# Patient Record
Sex: FEMALE | Race: WHITE | NOT HISPANIC OR LATINO | Employment: OTHER | URBAN - METROPOLITAN AREA
[De-identification: names, ages, dates, MRNs, and addresses within clinical notes are randomized per-mention and may not be internally consistent; named-entity substitution may affect disease eponyms.]

---

## 2017-01-03 ENCOUNTER — INFUSION (OUTPATIENT)
Dept: INFUSION THERAPY | Facility: HOSPITAL | Age: 72
End: 2017-01-03
Attending: INTERNAL MEDICINE
Payer: MEDICARE

## 2017-01-03 DIAGNOSIS — C50.912 MALIGNANT NEOPLASM OF LEFT FEMALE BREAST, UNSPECIFIED SITE OF BREAST: Primary | ICD-10-CM

## 2017-01-03 PROCEDURE — 63600175 PHARM REV CODE 636 W HCPCS: Mod: PN

## 2017-01-03 PROCEDURE — 96401 CHEMO ANTI-NEOPL SQ/IM: CPT | Mod: PN

## 2017-01-03 RX ADMIN — DENOSUMAB 120 MG: 120 INJECTION SUBCUTANEOUS at 10:01

## 2017-02-02 ENCOUNTER — INFUSION (OUTPATIENT)
Dept: INFUSION THERAPY | Facility: HOSPITAL | Age: 72
End: 2017-02-02
Attending: INTERNAL MEDICINE
Payer: MEDICARE

## 2017-02-02 VITALS — RESPIRATION RATE: 20 BRPM | SYSTOLIC BLOOD PRESSURE: 140 MMHG | DIASTOLIC BLOOD PRESSURE: 66 MMHG | HEART RATE: 97 BPM

## 2017-02-02 DIAGNOSIS — C50.912 MALIGNANT NEOPLASM OF LEFT FEMALE BREAST, UNSPECIFIED SITE OF BREAST: Primary | ICD-10-CM

## 2017-02-02 PROCEDURE — 63600175 PHARM REV CODE 636 W HCPCS: Mod: PN

## 2017-02-02 PROCEDURE — 96401 CHEMO ANTI-NEOPL SQ/IM: CPT | Mod: PN

## 2017-02-02 RX ADMIN — DENOSUMAB 120 MG: 120 INJECTION SUBCUTANEOUS at 11:02

## 2017-03-02 ENCOUNTER — INFUSION (OUTPATIENT)
Dept: INFUSION THERAPY | Facility: HOSPITAL | Age: 72
End: 2017-03-02
Attending: INTERNAL MEDICINE
Payer: MEDICARE

## 2017-03-02 DIAGNOSIS — C50.912 MALIGNANT NEOPLASM OF LEFT FEMALE BREAST, UNSPECIFIED SITE OF BREAST: Primary | ICD-10-CM

## 2017-03-02 PROCEDURE — 63600175 PHARM REV CODE 636 W HCPCS: Mod: PN

## 2017-03-02 PROCEDURE — 96523 IRRIG DRUG DELIVERY DEVICE: CPT | Mod: PN

## 2017-03-02 PROCEDURE — 96401 CHEMO ANTI-NEOPL SQ/IM: CPT | Mod: PN

## 2017-03-02 PROCEDURE — 25000003 PHARM REV CODE 250: Mod: PN | Performed by: PHYSICIAN ASSISTANT

## 2017-03-02 RX ORDER — HEPARIN 100 UNIT/ML
500 SYRINGE INTRAVENOUS
Status: DISCONTINUED | OUTPATIENT
Start: 2017-03-02 | End: 2017-03-02 | Stop reason: HOSPADM

## 2017-03-02 RX ORDER — HEPARIN 100 UNIT/ML
500 SYRINGE INTRAVENOUS
Status: CANCELLED
Start: 2017-03-30

## 2017-03-02 RX ORDER — SODIUM CHLORIDE 0.9 % (FLUSH) 0.9 %
10 SYRINGE (ML) INJECTION
Status: CANCELLED
Start: 2017-03-30

## 2017-03-02 RX ORDER — SODIUM CHLORIDE 0.9 % (FLUSH) 0.9 %
10 SYRINGE (ML) INJECTION
Status: DISCONTINUED | OUTPATIENT
Start: 2017-03-02 | End: 2017-03-02 | Stop reason: HOSPADM

## 2017-03-02 RX ADMIN — DENOSUMAB 120 MG: 120 INJECTION SUBCUTANEOUS at 10:03

## 2017-03-02 RX ADMIN — SODIUM CHLORIDE, PRESERVATIVE FREE 10 ML: 5 INJECTION INTRAVENOUS at 10:03

## 2017-03-02 NOTE — PLAN OF CARE
Problem: Patient Care Overview  Goal: Plan of Care Review  Outcome: Ongoing (interventions implemented as appropriate)  Pt tolerated injection without difficulty.  Instructed pt to call office for questions or concerns. Pt verbalized understanding. AVS printed with pt schedule

## 2017-03-02 NOTE — MR AVS SNAPSHOT
Patient Information     Patient Name Sex Laura Balbuena Female 1945      Visit Information        Provider Department Dept Phone Center    3/2/2017 10:00 AM CHAIR 14, [facility] [phone] [facility]      Patient Instructions     None      Your Current Medications Are     ACID REDUCER, CIMETIDINE, ORAL    alprazolam (XANAX) 1 MG tablet    amlodipine (NORVASC) 5 MG tablet    aspirin (ECOTRIN) 81 MG EC tablet    CALCIUM CARBONATE (CORAL CALCIUM ORAL)    cetirizine (ZYRTEC) 10 MG tablet    clonazePAM (KLONOPIN) 1 MG tablet    coenzyme Q10 (CO Q-10) 100 mg capsule    denosumab (XGEVA) 120 mg/1.7 mL (70 mg/mL) Soln    DURAGESIC 50 mcg/hr    ERGOCALCIFEROL, VITAMIN D2, (VITAMIN D ORAL)    exemestane (AROMASIN) 25 mg tablet    hydrocodone-acetaminophen 10-325mg (NORCO)  mg Tab    levothyroxine (SYNTHROID, LEVOTHROID) 175 MCG tablet    megestrol (MEGACE ES) 625 mg/5 mL Susp    nitroGLYCERIN (NITROSTAT) 0.4 MG SL tablet    pravastatin (PRAVACHOL) 20 MG tablet    ranitidine (ZANTAC) 150 MG capsule    vortioxetine (TRINTELLIX) 10 mg Tab    DURAGESIC 50 mcg/hr (Discontinued)    hydrocodone-acetaminophen 10-325mg (NORCO)  mg Tab (Discontinued)      Facility-Administered Medications     denosumab (XGEVA) solution 120 mg    heparin, porcine (PF) 100 unit/mL injection flush 500 Units    sodium chloride 0.9% flush 10 mL      Appointments for Next Year     3/30/2017  8:30 AM ESTABLISHED PATIENT (15 min.) [facility] Prabhjot Bernardo MD    Arrive at check-in approximately 15 minutes before your scheduled appointment time. Bring all outside medical records and imaging, along with a list of your current medications and insurance card.    3/30/2017  9:30 AM INFUSION 030 MIN (30 min.) [facility] CHAIR 20, [facility] CHEMO    Arrive at check-in approximately 15 minutes before your scheduled appointment time. Bring all outside medical  "records and imaging, along with a list of your current medications and insurance card.    1st Floor    5/25/2017  3:30 PM INFUSION 030 MIN (30 min.) [facility] CHAIR 10, [facility] CHEMO    Arrive at check-in approximately 15 minutes before your scheduled appointment time. Bring all outside medical records and imaging, along with a list of your current medications and insurance card.    1st Floor    8/15/2017  1:00 PM ESTABLISHED PATIENT (30 min.) [facility] - Cardiology Isi Hartley MD    Arrive at check-in approximately 15 minutes before your scheduled appointment time. Bring all outside medical records and imaging, along with a list of your current medications and insurance card.         Default Flowsheet Data (last 24 hours)      Amb Complex Vitals Juan Diego        03/02/17 0906                Measurements    Weight 59.5 kg (131 lb 3.2 oz)        Height 5' 3" (1.6 m)        BSA (Calculated - sq m) 1.63 sq meters        BMI (Calculated) 23.3        BP (!)  159/81        Temp 97.3 °F (36.3 °C)        Pulse 102        Resp 16                Allergies     Wellbutrin [Bupropion Hcl] Anxiety      Medications You Received from 03/01/2017 1017 to 03/02/2017 1017        Date/Time Order Dose Route Action     03/02/2017 1016 denosumab (XGEVA) solution 120 mg 120 mg Subcutaneous Given     03/02/2017 1016 sodium chloride 0.9% flush 10 mL 10 mL Intravenous Given      Current Discharge Medication List     Cannot display discharge medications since this is not an admission.      "

## 2017-03-02 NOTE — NURSING
LCW powerport cleaned and accessed with one inch riddle needle. Positive blood return noted. Flushed with 10cc NS, deaccessed riddle needle and applied band aid

## 2017-03-30 ENCOUNTER — INFUSION (OUTPATIENT)
Dept: INFUSION THERAPY | Facility: HOSPITAL | Age: 72
End: 2017-03-30
Attending: INTERNAL MEDICINE
Payer: MEDICARE

## 2017-03-30 DIAGNOSIS — C50.912 MALIGNANT NEOPLASM OF LEFT FEMALE BREAST, UNSPECIFIED SITE OF BREAST: Primary | ICD-10-CM

## 2017-03-30 PROCEDURE — 96401 CHEMO ANTI-NEOPL SQ/IM: CPT | Mod: PN

## 2017-03-30 PROCEDURE — 63600175 PHARM REV CODE 636 W HCPCS: Mod: PN | Performed by: PHYSICIAN ASSISTANT

## 2017-03-30 RX ORDER — HEPARIN 100 UNIT/ML
500 SYRINGE INTRAVENOUS
Status: CANCELLED
Start: 2017-04-27

## 2017-03-30 RX ORDER — SODIUM CHLORIDE 0.9 % (FLUSH) 0.9 %
10 SYRINGE (ML) INJECTION
Status: CANCELLED
Start: 2017-04-27

## 2017-03-30 RX ADMIN — DENOSUMAB 120 MG: 120 INJECTION SUBCUTANEOUS at 09:03

## 2017-03-30 NOTE — PATIENT INSTRUCTIONS
"  Calcium (Blood)  Does this test have other names?  Total calcium, ionized calcium  What is this test?  A calcium blood test measures how much calcium is in your blood. Your health care provider can use this test to help diagnose and watch many conditions. There are two types of calcium blood tests. One is total calcium and the other is ionized calcium. Ionized calcium measures the "free" calcium in your blood. This is the calcium not bound to other parts of the blood.   Why do I need this test?  Your health care provider may order a calcium blood test to help diagnose a variety of disorders. These include kidney disease, pancreatitis, and disease of the parathyroid gland. Calcium levels may also be abnormal in many types of cancer. Your provider might also order this test as part of a routine health check.  A normal calcium level in the blood is a good sign that your body is likely working as it should. Calcium levels that are too low (hypocalcemia) or too high (hypercalcemia) can mean of a number of problems.  People with abnormal calcium levels may not have any symptoms. Very low calcium levels can cause seizures, irregular heartbeat, muscle spasms, or tingling in the hands or feet. People with high calcium levels may have nausea, vomiting, severe thirst, or constipation. Your health care provider will use the results of a blood calcium test to figure out how to treat the underlying cause of any health problems you may have.  What other tests might I have along with this test?  Calcium can be tested for a number of reasons. Other tests will vary based on what your health care provider is looking for.   Your provider may also order tests of kidney function, vitamin D, phosphorus levels, and parathyroid hormone. These tests can help figure out what is causing your abnormal calcium levels.  What do my test results mean?  Many things may affect your lab test results. These include the method each lab uses to do the " test. Some laboratories may have slightly different normal values than the ones below. Even if your test results are different from the normal value, you may not have a problem. To learn what the results mean for you, talk with your health care provider.  A normal range of total blood calcium in adults is usually between 8.5 and 10.3 milligrams/deciliter (mg/dL). Ionized calcium generally should be higher than 4.6 mg/dL to be a normal level.   How is this test done?  The test requires a blood sample, which is drawn through a needle from a vein in your arm.  Does this test pose any risks?  Taking a blood sample with a needle carries risks that include bleeding, infection, bruising, or feeling dizzy. When the needle pricks your arm, you may feel a slight stinging sensation or pain. Afterward, the site may be slightly sore.  What might affect my test results?  A number of things can affect the results of a calcium blood test. This test is typically done at the same time as other blood tests to get a better picture of your overall health. Certain medicines can change blood calcium levels and affect the test results.   How do I get ready for this test?  You don't need to prepare for this test. Be sure your health care provider knows about all medicines, herbs, vitamins, and supplements you are taking. This includes medicines that don't need a prescription and any illicit drugs you may use.    Date Last Reviewed: 6/28/2015 © 2000-2016 [employer]. [address] [place] [address]. All rights reserved. This information is not intended as a substitute for professional medical care. Always follow your healthcare professional's instructions.        Calcium Supplements  Calcium is a mineral that helps make strong bones and teeth. Most of the calcium in your body is in your bones. It takes almost half of your life (30 to 35 years) for your bones to reach their maximum size and strength (peak bone mass).  When you are younger, taking in enough calcium helps you build strong bones. When you are older, getting enough calcium in your diet helps to limit bone loss. Your body cant make calcium, so you must get it from foods or supplements.    Why use a supplement?  You might need a supplement if any of the following is true for you:  · I dont eat dairy products or other foods that are high in calcium (such as kale, bok rafi, and calcium-fortified foods) 2 to 3 times a day.  · I am a woman past menopause.  · I am pregnant or breastfeeding.  · I do not do frequent weight-bearing exercises (such as walking, running, or weightlifting).  Suggested daily amount  The daily recommended amount of calcium depends on many factors. This includes your age and if you are a man or a woman. Your health care provider can help you choose the right amount of calcium for you.   If you take calcium  Here are some tips to help you get the most from a calcium supplement:  · Choose calcium carbonate or calcium citrate.  · Choose calcium carbonate or calcium citrate. Calcium carbonate needs stomach acid in order to be absorbed. So it is best absorbed when taken with meals. Calcium citrate is absorbed the same when taken with or without food.  · Check the label for elemental calcium. You need 1,000 to 1,500 mg a day.  · If you also take an iron supplement, take it a few hours before or after the calcium.  · Be aware that taking calcium interferes with the absorption of some bacteria-fighting medicines (antibiotics). Talk to your provider or pharmacist.  · Eat a balanced diet to get all the nutrients your body needs.  Food sources of calcium  · Milk, yogurt, and cheese  · Certain green leafy vegetables, such as kale, bok rafi, and pricila greens  · Fish with bones, such as canned salmon, mackerel, and sardines  · Tofu made with calcium carbonate (not the type of tofu called nagiri)  · Drinks that have calcium added, such as some orange juice and  rice and soy drinks   Date Last Reviewed: 5/11/2015  © 7853-5306 [employer]. [address] [place] [address], [place] [address]. All rights reserved. This information is not intended as a substitute for professional medical care. Always follow your healthcare professional's instructions.

## 2017-03-30 NOTE — PLAN OF CARE
Problem: Patient Care Overview  Goal: Plan of Care Review  Outcome: Ongoing (interventions implemented as appropriate)  Pt. Completed treatment, tolerated without noted distress.Vtial signs stable. Patient discharged from infusion center ambulatory. Patient had all present questions answered. Reviewed importance of calcium supplementation daily and better absorption when taken with food.

## 2017-03-30 NOTE — MR AVS SNAPSHOT
"Patient Information     Patient Name Sex Laura Balbuena Female 1945      Visit Information        Provider Department Dept Phone Center    3/30/2017 9:30 AM CHAIR [facility] [phone] [facility]      Patient Instructions      Calcium (Blood)  Does this test have other names?  Total calcium, ionized calcium  What is this test?  A calcium blood test measures how much calcium is in your blood. Your health care provider can use this test to help diagnose and watch many conditions. There are two types of calcium blood tests. One is total calcium and the other is ionized calcium. Ionized calcium measures the "free" calcium in your blood. This is the calcium not bound to other parts of the blood.   Why do I need this test?  Your health care provider may order a calcium blood test to help diagnose a variety of disorders. These include kidney disease, pancreatitis, and disease of the parathyroid gland. Calcium levels may also be abnormal in many types of cancer. Your provider might also order this test as part of a routine health check.  A normal calcium level in the blood is a good sign that your body is likely working as it should. Calcium levels that are too low (hypocalcemia) or too high (hypercalcemia) can mean of a number of problems.  People with abnormal calcium levels may not have any symptoms. Very low calcium levels can cause seizures, irregular heartbeat, muscle spasms, or tingling in the hands or feet. People with high calcium levels may have nausea, vomiting, severe thirst, or constipation. Your health care provider will use the results of a blood calcium test to figure out how to treat the underlying cause of any health problems you may have.  What other tests might I have along with this test?  Calcium can be tested for a number of reasons. Other tests will vary based on what your health care provider is looking for.   Your provider may also order " tests of kidney function, vitamin D, phosphorus levels, and parathyroid hormone. These tests can help figure out what is causing your abnormal calcium levels.  What do my test results mean?  Many things may affect your lab test results. These include the method each lab uses to do the test. Some laboratories may have slightly different normal values than the ones below. Even if your test results are different from the normal value, you may not have a problem. To learn what the results mean for you, talk with your health care provider.  A normal range of total blood calcium in adults is usually between 8.5 and 10.3 milligrams/deciliter (mg/dL). Ionized calcium generally should be higher than 4.6 mg/dL to be a normal level.   How is this test done?  The test requires a blood sample, which is drawn through a needle from a vein in your arm.  Does this test pose any risks?  Taking a blood sample with a needle carries risks that include bleeding, infection, bruising, or feeling dizzy. When the needle pricks your arm, you may feel a slight stinging sensation or pain. Afterward, the site may be slightly sore.  What might affect my test results?  A number of things can affect the results of a calcium blood test. This test is typically done at the same time as other blood tests to get a better picture of your overall health. Certain medicines can change blood calcium levels and affect the test results.   How do I get ready for this test?  You don't need to prepare for this test. Be sure your health care provider knows about all medicines, herbs, vitamins, and supplements you are taking. This includes medicines that don't need a prescription and any illicit drugs you may use.    Date Last Reviewed: 6/28/2015  © 9339-7671 [employer]. [address] [place] [address], [place] [address]. All rights reserved. This information is not intended as a substitute for professional medical care. Always follow your healthcare  professional's instructions.        Calcium Supplements  Calcium is a mineral that helps make strong bones and teeth. Most of the calcium in your body is in your bones. It takes almost half of your life (30 to 35 years) for your bones to reach their maximum size and strength (peak bone mass). When you are younger, taking in enough calcium helps you build strong bones. When you are older, getting enough calcium in your diet helps to limit bone loss. Your body cant make calcium, so you must get it from foods or supplements.    Why use a supplement?  You might need a supplement if any of the following is true for you:  · I dont eat dairy products or other foods that are high in calcium (such as kale, bok rafi, and calcium-fortified foods) 2 to 3 times a day.  · I am a woman past menopause.  · I am pregnant or breastfeeding.  · I do not do frequent weight-bearing exercises (such as walking, running, or weightlifting).  Suggested daily amount  The daily recommended amount of calcium depends on many factors. This includes your age and if you are a man or a woman. Your health care provider can help you choose the right amount of calcium for you.   If you take calcium  Here are some tips to help you get the most from a calcium supplement:  · Choose calcium carbonate or calcium citrate.  · Choose calcium carbonate or calcium citrate. Calcium carbonate needs stomach acid in order to be absorbed. So it is best absorbed when taken with meals. Calcium citrate is absorbed the same when taken with or without food.  · Check the label for elemental calcium. You need 1,000 to 1,500 mg a day.  · If you also take an iron supplement, take it a few hours before or after the calcium.  · Be aware that taking calcium interferes with the absorption of some bacteria-fighting medicines (antibiotics). Talk to your provider or pharmacist.  · Eat a balanced diet to get all the nutrients your body needs.  Food sources of calcium  · Milk, yogurt,  and cheese  · Certain green leafy vegetables, such as kale, bok rafi, and pricila greens  · Fish with bones, such as canned salmon, mackerel, and sardines  · Tofu made with calcium carbonate (not the type of tofu called nagiri)  · Drinks that have calcium added, such as some orange juice and rice and soy drinks   Date Last Reviewed: 5/11/2015  © 8651-9131 [employer]. [address] [place] [address], [place] [address]. All rights reserved. This information is not intended as a substitute for professional medical care. Always follow your healthcare professional's instructions.             Your Current Medications Are     ACID REDUCER, CIMETIDINE, ORAL    alprazolam (XANAX) 1 MG tablet    amlodipine (NORVASC) 5 MG tablet    aspirin (ECOTRIN) 81 MG EC tablet    CALCIUM CARBONATE (CORAL CALCIUM ORAL)    cetirizine (ZYRTEC) 10 MG tablet    clonazePAM (KLONOPIN) 1 MG tablet    coenzyme Q10 (CO Q-10) 100 mg capsule    denosumab (XGEVA) 120 mg/1.7 mL (70 mg/mL) Soln    DURAGESIC 50 mcg/hr    ERGOCALCIFEROL, VITAMIN D2, (VITAMIN D ORAL)    exemestane (AROMASIN) 25 mg tablet    hydrocodone-acetaminophen 10-325mg (NORCO)  mg Tab    levothyroxine (SYNTHROID, LEVOTHROID) 175 MCG tablet    megestrol (MEGACE ES) 625 mg/5 mL Susp    nitroGLYCERIN (NITROSTAT) 0.4 MG SL tablet    pravastatin (PRAVACHOL) 20 MG tablet    ranitidine (ZANTAC) 150 MG capsule    vortioxetine (TRINTELLIX) 10 mg Tab      Facility-Administered Medications     denosumab (XGEVA) solution 120 mg      Appointments for Next Year     4/27/2017 12:30 PM INFUSION 030 MIN (30 min.) [facility]    Arrive at check-in approximately 15 minutes before your scheduled appointment time. Bring all outside medical records and imaging, along with a list of your current medications and insurance card.    1st Floor    5/25/2017  3:30 PM INFUSION 030 MIN (30 min.) [facility] CHAIR 10, [facility]     "Arrive at check-in approximately 15 minutes before your scheduled appointment time. Bring all outside medical records and imaging, along with a list of your current medications and insurance card.    1st Floor    8/15/2017  1:00 PM ESTABLISHED PATIENT (30 min.) [facility] - Cardiology Isi Hartley MD    Arrive at check-in approximately 15 minutes before your scheduled appointment time. Bring all outside medical records and imaging, along with a list of your current medications and insurance card.         Default Flowsheet Data (last 24 hours)      Amb Complex Vitals Juan Diego        03/30/17 0835                Measurements    Weight 62.1 kg (137 lb)        Height 5' 4" (1.626 m)        BSA (Calculated - sq m) 1.68 sq meters        BMI (Calculated) 23.6        BP (!)  144/65        Temp 98.2 °F (36.8 °C)        Pulse 100        Pain Assessment    Pain Score Ten                Allergies     Wellbutrin [Bupropion Hcl] Anxiety      Current Discharge Medication List     Cannot display discharge medications since this is not an admission.      "

## 2017-04-24 ENCOUNTER — INFUSION (OUTPATIENT)
Dept: INFUSION THERAPY | Facility: HOSPITAL | Age: 72
End: 2017-04-24
Attending: INTERNAL MEDICINE
Payer: MEDICARE

## 2017-04-24 DIAGNOSIS — C50.912 MALIGNANT NEOPLASM OF LEFT FEMALE BREAST, UNSPECIFIED SITE OF BREAST: Primary | ICD-10-CM

## 2017-04-24 PROCEDURE — 63600175 PHARM REV CODE 636 W HCPCS: Mod: PN | Performed by: PHYSICIAN ASSISTANT

## 2017-04-24 PROCEDURE — 96401 CHEMO ANTI-NEOPL SQ/IM: CPT | Mod: PN

## 2017-04-24 RX ORDER — HEPARIN 100 UNIT/ML
500 SYRINGE INTRAVENOUS
Status: CANCELLED
Start: 2017-04-27

## 2017-04-24 RX ORDER — SODIUM CHLORIDE 0.9 % (FLUSH) 0.9 %
10 SYRINGE (ML) INJECTION
Status: CANCELLED
Start: 2017-04-27

## 2017-04-24 RX ADMIN — DENOSUMAB 120 MG: 120 INJECTION SUBCUTANEOUS at 10:04

## 2017-05-12 PROBLEM — J32.9 CHRONIC SINUSITIS: Status: ACTIVE | Noted: 2017-05-12

## 2017-05-12 PROBLEM — S02.32XA FRACTURE OF ORBITAL FLOOR, LEFT SIDE, INITIAL ENCOUNTER FOR CLOSED FRACTURE: Status: ACTIVE | Noted: 2017-05-12

## 2017-05-18 PROBLEM — J32.9 CHRONIC SINUSITIS: Status: ACTIVE | Noted: 2017-05-18

## 2017-05-26 ENCOUNTER — DOCUMENTATION ONLY (OUTPATIENT)
Dept: INFUSION THERAPY | Facility: HOSPITAL | Age: 72
End: 2017-05-26

## 2017-05-26 NOTE — PROGRESS NOTES
Rec'd call from pt's son regarding request for assistance from [employer] for rx. Contacted Bryanna at [facility] and she approved rx. Pt's son advised to take rx to [facility]. Renee Lawton LCSW

## 2017-06-30 ENCOUNTER — INFUSION (OUTPATIENT)
Dept: INFUSION THERAPY | Facility: HOSPITAL | Age: 72
End: 2017-06-30
Attending: INTERNAL MEDICINE
Payer: MEDICARE

## 2017-06-30 ENCOUNTER — DOCUMENTATION ONLY (OUTPATIENT)
Dept: INFUSION THERAPY | Facility: HOSPITAL | Age: 72
End: 2017-06-30

## 2017-06-30 VITALS
HEIGHT: 64 IN | WEIGHT: 139 LBS | HEART RATE: 98 BPM | SYSTOLIC BLOOD PRESSURE: 140 MMHG | RESPIRATION RATE: 18 BRPM | DIASTOLIC BLOOD PRESSURE: 70 MMHG | BODY MASS INDEX: 23.73 KG/M2 | TEMPERATURE: 99 F

## 2017-06-30 DIAGNOSIS — C50.912 MALIGNANT NEOPLASM OF LEFT FEMALE BREAST, UNSPECIFIED SITE OF BREAST: Primary | ICD-10-CM

## 2017-06-30 PROBLEM — C50.919 MALIGNANT NEOPLASM OF FEMALE BREAST: Status: ACTIVE | Noted: 2017-06-30

## 2017-06-30 PROCEDURE — 96401 CHEMO ANTI-NEOPL SQ/IM: CPT | Mod: PN

## 2017-06-30 PROCEDURE — 63600175 PHARM REV CODE 636 W HCPCS: Mod: PN | Performed by: PHYSICIAN ASSISTANT

## 2017-06-30 RX ORDER — SODIUM CHLORIDE 0.9 % (FLUSH) 0.9 %
10 SYRINGE (ML) INJECTION
Status: CANCELLED
Start: 2017-06-30

## 2017-06-30 RX ORDER — HEPARIN 100 UNIT/ML
500 SYRINGE INTRAVENOUS
Status: CANCELLED
Start: 2017-06-30

## 2017-06-30 RX ADMIN — DENOSUMAB 120 MG: 120 INJECTION SUBCUTANEOUS at 10:06

## 2017-06-30 NOTE — PATIENT INSTRUCTIONS
Fall Prevention  Falls often occur due to slipping, tripping or losing your balance. Millions of people fall every year and injure themselves. Here are ways to reduce your risk of falling again.  · Think about your fall, was there anything that caused your fall that can be fixed, removed, or replaced?  · Make your home safe by keeping walkways clear of objects you may trip over.  · Use non-slip pads under rugs. Do not use area rugs or small throw rugs.  · Use non-slip mats in bathtubs and showers.  · Install handrails and lights on staircases.  · Do not walk in poorly lit areas.  · Do not stand on chairs or wobbly ladders.  · Use caution when reaching overhead or looking upward. This position can cause a loss of balance.  · Be sure your shoes fit properly, have non-slip bottoms and are in good condition.   · Wear shoes both inside and out. Avoid going barefoot or wearing slippers.  · Be cautious when going up and down stairs, curbs, and when walking on uneven sidewalks.  · If your balance is poor, consider using a cane or walker.  · If your fall was related to alcohol use, stop or limit alcohol intake.   · If your fall was related to use of sleeping medicines, talk to your doctor about this. You may need to reduce your dosage at bedtime if you awaken during the night to go to the bathroom.    · To reduce the need for nighttime bathroom trips:  ¨ Avoid drinking fluids for several hours before going to bed  ¨ Empty your bladder before going to bed  ¨ Men can keep a urinal at the bedside  · Stay as active as you can. Balance, flexibility, strength, and endurance all come from exercise. They all play a role in preventing falls. Ask your healthcare provider which types of activity are right for you.  · Get your vision checked on a regular basis.  · If you have pets, know where they are before you stand up or walk so you don't trip over them.  · Use night lights.  Date Last Reviewed: 11/5/2015  © 8468-1781 [employer]. [address] [place] [address], [place] [address]. All rights reserved. This information is not intended as a substitute for professional medical care. Always follow your healthcare professional's instructions.

## 2017-06-30 NOTE — PLAN OF CARE
Problem: Patient Care Overview  Goal: Plan of Care Review  Pt. Completed treatment, tolerated xgeva without noted distress.Vital signs stable. Patient discharged from infusion center . Patient had all present questions answered. Reviewed upcoming appointments.

## 2017-07-31 ENCOUNTER — INFUSION (OUTPATIENT)
Dept: INFUSION THERAPY | Facility: HOSPITAL | Age: 72
End: 2017-07-31
Attending: INTERNAL MEDICINE
Payer: MEDICARE

## 2017-07-31 VITALS
DIASTOLIC BLOOD PRESSURE: 71 MMHG | RESPIRATION RATE: 14 BRPM | WEIGHT: 140 LBS | HEIGHT: 64 IN | BODY MASS INDEX: 23.9 KG/M2 | TEMPERATURE: 98 F | HEART RATE: 92 BPM | SYSTOLIC BLOOD PRESSURE: 167 MMHG

## 2017-07-31 DIAGNOSIS — C50.912 MALIGNANT NEOPLASM OF LEFT FEMALE BREAST, UNSPECIFIED ESTROGEN RECEPTOR STATUS, UNSPECIFIED SITE OF BREAST: Primary | ICD-10-CM

## 2017-07-31 PROCEDURE — 63600175 PHARM REV CODE 636 W HCPCS: Mod: PN | Performed by: PHYSICIAN ASSISTANT

## 2017-07-31 PROCEDURE — 96523 IRRIG DRUG DELIVERY DEVICE: CPT | Mod: PN

## 2017-07-31 PROCEDURE — 96401 CHEMO ANTI-NEOPL SQ/IM: CPT | Mod: PN

## 2017-07-31 RX ORDER — SODIUM CHLORIDE 0.9 % (FLUSH) 0.9 %
10 SYRINGE (ML) INJECTION
Status: CANCELLED
Start: 2017-07-31

## 2017-07-31 RX ORDER — HEPARIN 100 UNIT/ML
500 SYRINGE INTRAVENOUS
Status: CANCELLED
Start: 2017-07-31

## 2017-07-31 RX ORDER — SODIUM CHLORIDE 0.9 % (FLUSH) 0.9 %
10 SYRINGE (ML) INJECTION
Status: DISCONTINUED | OUTPATIENT
Start: 2017-07-31 | End: 2017-07-31 | Stop reason: HOSPADM

## 2017-07-31 RX ADMIN — DENOSUMAB 120 MG: 120 INJECTION SUBCUTANEOUS at 11:07

## 2017-08-02 ENCOUNTER — DOCUMENTATION ONLY (OUTPATIENT)
Dept: INFUSION THERAPY | Facility: HOSPITAL | Age: 72
End: 2017-08-02

## 2017-08-02 NOTE — PROGRESS NOTES
: Met with pt's son and assisted him in reapplying to [facility] for assistance with medication co-pays. Also contacted [employer] on pt's behalf to inquire about status of reapplication for pain medication support. Provided new application for pt to complete. Pt needs to have Dr. Bernardo's office refax portion of application.  Renee Lawton LCSW

## 2017-08-16 ENCOUNTER — TELEPHONE (OUTPATIENT)
Dept: INFUSION THERAPY | Facility: HOSPITAL | Age: 72
End: 2017-08-16

## 2017-09-06 PROBLEM — R60.0 BILATERAL LOWER EXTREMITY EDEMA: Status: ACTIVE | Noted: 2017-09-06

## 2017-09-06 PROBLEM — Z72.0 TOBACCO USE: Status: ACTIVE | Noted: 2017-09-06

## 2017-09-06 PROBLEM — R06.02 SOB (SHORTNESS OF BREATH): Status: ACTIVE | Noted: 2017-09-06

## 2017-09-11 ENCOUNTER — DOCUMENTATION ONLY (OUTPATIENT)
Dept: INFUSION THERAPY | Facility: HOSPITAL | Age: 72
End: 2017-09-11

## 2017-09-11 ENCOUNTER — INFUSION (OUTPATIENT)
Dept: INFUSION THERAPY | Facility: HOSPITAL | Age: 72
End: 2017-09-11
Attending: INTERNAL MEDICINE
Payer: MEDICARE

## 2017-09-11 VITALS
WEIGHT: 147.19 LBS | RESPIRATION RATE: 16 BRPM | BODY MASS INDEX: 25.13 KG/M2 | HEART RATE: 92 BPM | HEIGHT: 64 IN | SYSTOLIC BLOOD PRESSURE: 142 MMHG | TEMPERATURE: 97 F | DIASTOLIC BLOOD PRESSURE: 70 MMHG

## 2017-09-11 DIAGNOSIS — C50.912 MALIGNANT NEOPLASM OF LEFT FEMALE BREAST, UNSPECIFIED ESTROGEN RECEPTOR STATUS, UNSPECIFIED SITE OF BREAST: Primary | ICD-10-CM

## 2017-09-11 PROCEDURE — 96401 CHEMO ANTI-NEOPL SQ/IM: CPT | Mod: PN

## 2017-09-11 PROCEDURE — 63600175 PHARM REV CODE 636 W HCPCS: Mod: PN | Performed by: PHYSICIAN ASSISTANT

## 2017-09-11 PROCEDURE — 96372 THER/PROPH/DIAG INJ SC/IM: CPT | Mod: PN

## 2017-09-11 RX ORDER — HEPARIN 100 UNIT/ML
500 SYRINGE INTRAVENOUS
Status: CANCELLED
Start: 2017-09-11

## 2017-09-11 RX ORDER — SODIUM CHLORIDE 0.9 % (FLUSH) 0.9 %
10 SYRINGE (ML) INJECTION
Status: CANCELLED
Start: 2017-09-11

## 2017-09-11 RX ADMIN — DENOSUMAB 120 MG: 120 INJECTION SUBCUTANEOUS at 01:09

## 2017-09-11 NOTE — PLAN OF CARE
Problem: Patient Care Overview  Goal: Plan of Care Review  Outcome: Ongoing (interventions implemented as appropriate)  Pt tolerated Xgeva injection well.  Reminded pt to take her daily Calcium and vitamin D supplements.  Instructed to call MD with any problems.

## 2017-10-06 PROBLEM — M25.551 RIGHT HIP PAIN: Status: ACTIVE | Noted: 2017-10-06

## 2017-10-10 ENCOUNTER — INFUSION (OUTPATIENT)
Dept: INFUSION THERAPY | Facility: HOSPITAL | Age: 72
End: 2017-10-10
Attending: INTERNAL MEDICINE
Payer: MEDICARE

## 2017-10-10 VITALS
SYSTOLIC BLOOD PRESSURE: 160 MMHG | TEMPERATURE: 98 F | DIASTOLIC BLOOD PRESSURE: 67 MMHG | RESPIRATION RATE: 17 BRPM | HEART RATE: 99 BPM | OXYGEN SATURATION: 99 %

## 2017-10-10 DIAGNOSIS — C50.912 MALIGNANT NEOPLASM OF LEFT FEMALE BREAST, UNSPECIFIED ESTROGEN RECEPTOR STATUS, UNSPECIFIED SITE OF BREAST: Primary | ICD-10-CM

## 2017-10-10 PROCEDURE — 63600175 PHARM REV CODE 636 W HCPCS: Mod: PN | Performed by: PHYSICIAN ASSISTANT

## 2017-10-10 PROCEDURE — 96401 CHEMO ANTI-NEOPL SQ/IM: CPT | Mod: PN

## 2017-10-10 PROCEDURE — 96372 THER/PROPH/DIAG INJ SC/IM: CPT | Mod: PN

## 2017-10-10 RX ORDER — SODIUM CHLORIDE 0.9 % (FLUSH) 0.9 %
10 SYRINGE (ML) INJECTION
Status: CANCELLED
Start: 2017-10-10

## 2017-10-10 RX ORDER — HEPARIN 100 UNIT/ML
500 SYRINGE INTRAVENOUS
Status: CANCELLED
Start: 2017-10-10

## 2017-10-10 RX ADMIN — DENOSUMAB 120 MG: 120 INJECTION SUBCUTANEOUS at 01:10

## 2017-10-10 NOTE — PATIENT INSTRUCTIONS
Denosumab injection  What is this medicine?  DENOSUMAB (den oh john mab) slows bone breakdown. Prolia is used to treat osteoporosis in women after menopause and in men. Xgeva is used to prevent bone fractures and other bone problems caused by cancer bone metastases. Xgeva is also used to treat giant cell tumor of the bone.  How should I use this medicine?  This medicine is for injection under the skin. It is given by a health care professional in a hospital or clinic setting.  If you are getting Prolia, a special MedGuide will be given to you by the pharmacist with each prescription and refill. Be sure to read this information carefully each time.  For Prolia, talk to your pediatrician regarding the use of this medicine in children. Special care may be needed. For Xgeva, talk to your pediatrician regarding the use of this medicine in children. While this drug may be prescribed for children as young as 13 years for selected conditions, precautions do apply.  What side effects may I notice from receiving this medicine?  Side effects that you should report to your doctor or health care professional as soon as possible:  · allergic reactions like skin rash, itching or hives, swelling of the face, lips, or tongue  · breathing problems  · chest pain  · fast, irregular heartbeat  · feeling faint or lightheaded, falls  · fever, chills, or any other sign of infection  · muscle spasms, tightening, or twitches  · numbness or tingling  · skin blisters or bumps, or is dry, peels, or red  · slow healing or unexplained pain in the mouth or jaw  · unusual bleeding or bruising  Side effects that usually do not require medical attention (Report these to your doctor or health care professional if they continue or are bothersome.):  · muscle pain  · stomach upset, gas  What may interact with this medicine?  Do not take this medicine with any of the following medications:  · other medicines containing denosumab  This medicine may also  interact with the following medications:  · medicines that suppress the immune system  · medicines that treat cancer  · steroid medicines like prednisone or cortisone  What if I miss a dose?  It is important not to miss your dose. Call your doctor or health care professional if you are unable to keep an appointment.  Where should I keep my medicine?  This medicine is only given in a clinic, doctor's office, or other health care setting and will not be stored at home.  What should I tell my health care provider before I take this medicine?  They need to know if you have any of these conditions:  · dental disease  · eczema  · infection or history of infections  · kidney disease or on dialysis  · low blood calcium or vitamin D  · malabsorption syndrome  · scheduled to have surgery or tooth extraction  · taking medicine that contains denosumab  · thyroid or parathyroid disease  · an unusual reaction to denosumab, other medicines, foods, dyes, or preservatives  · pregnant or trying to get pregnant  · breast-feeding  What should I watch for while using this medicine?  Visit your doctor or health care professional for regular checks on your progress. Your doctor or health care professional may order blood tests and other tests to see how you are doing.  Call your doctor or health care professional if you get a cold or other infection while receiving this medicine. Do not treat yourself. This medicine may decrease your body's ability to fight infection.  You should make sure you get enough calcium and vitamin D while you are taking this medicine, unless your doctor tells you not to. Discuss the foods you eat and the vitamins you take with your health care professional.  See your dentist regularly. Brush and floss your teeth as directed. Before you have any dental work done, tell your dentist you are receiving this medicine.  Do not become pregnant while taking this medicine or for 5 months after stopping it. Women should  inform their doctor if they wish to become pregnant or think they might be pregnant. There is a potential for serious side effects to an unborn child. Talk to your health care professional or pharmacist for more information.  NOTE:This sheet is a summary. It may not cover all possible information. If you have questions about this medicine, talk to your doctor, pharmacist, or health care provider. Copyright© 2017 [employer]        Preventing Falls in the Home  An adult or child can fall for many reasons. If you are an older adult, you may fall because your reaction time slows down. Your muscles and joints may get stiff, weak, or less flexible because of illness, medicines, or a physical condition. These things can also make a child more likely to fall or be injured in a fall.  Other health problems that make falls more likely include:  · Arthritis  · Dizziness or lightheadedness when you get out of bed (orthostatic hypotension)  · History of a stroke  · Dizziness  · Anemia  · Certain medicines taken for mental illness  · Problems with balance or gait  · History of falls with or without an injury  · Changes in vision (vision impairment)  · Changes in thinking skills and memory (cognitive impairment)  Injuries from a fall can include broken bones, dislocated joints, and cuts. When these injuries are serious enough, they can make it impossible for you or a child who is injured in a fall to live on his or her own.  Prevention tips  To help prevent falls and fall-related injuries, follow the tips below.   Floors  Make floors safer by doing the following:   · Put nonskid pads under area rugs.  · Remove throw rugs.  · Replace worn floor coverings.  · Tack carpets firmly to each step on carpeted stairs. Put nonskid strips on the edges of uncarpeted stairs.  · Keep floors and stairs free of clutter and cords.  · Arrange furniture so there are clear pathways.  · Clean up any spills right away.  · Wear shoes that  fit.  Bathrooms    Make bathrooms safer by doing the following:   · Install grab bars in the tub or shower.  · Apply nonskid strips or put a nonskid rubber mat in the tub or shower.  · Sit on a bath chair to bathe.  · Use bathmats with nonskid backing.  Lighting and the environment  Improve lighting in your home by doing the following:   · Keep a flashlight in each room. Or put a lamp next to the bed within easy reach.  · Put nightlights in the bedrooms, hallways, kitchen, and bathrooms.  · Make sure all stairways have good lighting.  · Take your time when going up and down stairs.  · Put handrails on both sides of stairs and in walkways for more support. To prevent injury to your wrist or arm, dont use handrails to pull yourself up.  · Install grab bars to pull yourself up.  · Move or rearrange items that you use often. This will make them easier to find or reach.  · Look at your home to find any safety hazards. Especially look at doorways, walkways, and the driveway. Remove or repair any safety problems that you find.  Date Last Reviewed: 8/1/2016  © 9429-9859 [employer]. [address] [place] [address], [place] [id number]. All rights reserved. This information is not intended as a substitute for professional medical care. Always follow your healthcare professional's instructions.        Preventing Falls: Are You At Risk of Falling?     Ask for help to reduce risk of falling in your home.     As you get older, you're not as steady on your feet as you once were. And you may have health problems you didn't have when you were younger. So, it's not surprising that older people are more likely to trip and fall. Falling can be very serious. It can change your overall health and quality of life. That's why it's important to be aware of your own risk of falling.  The dangers of falling  Falls are one of the main causes of injury in people over age 65. An older person who falls may take longer to get better than a  "younger person. And, after a fall, an older person is more likely to have problems that don't go away. So, preventing falls can help you avoid serious health problems.  Are you at risk of falling?  Answer these questions to rate your level of risk.  · Are you a woman?  · Have you fallen or stumbled in the last year?  · Are you over age 65?  · Are you ever dizzy or lightheaded with standing?  · Do you have a hard time getting in and out of the bathtub or on and off the toilet?  · Do you lean on objects to help you get around? Or do you use a cane or walker?  · Do you have vision or hearing problems? For example, do you need new glasses or hearing aids?  · Do you have 2 or more long-lasting (chronic) medical conditions?  · Do you take 3 or more medicines?  · Have you felt depressed recently?  · Have you had more trouble with your memory in recent months?  · Are there hazards in your home that might cause you to fall, such as loose rugs or poor lighting?  · Do you have a pet that jumps on you or might trip you?  · Have you stopped getting regular exercise?  · Do you have diabetes?   · Do you have a neurologic disease, such as Parkinson or Alzheimer disease?   · Do you drink alcohol?  · Do you wear athletic shoes or slippers, or go barefoot at home?  You can help prevent falls  If you answered "yes" to any of the above questions, you should take steps to reduce your risk of a fall. Monitoring health conditions and keeping walkways in your home free of clutter are just 2 ways. Changing is sometimes easier said than done. But keep in mind that even small changes can make you less likely to fall.  The fear of falling  It's normal to be scared of falling, especially if you've fallen before. But being afraid can actually make you more likely to fall. This is because:  · Fear might cause you to become less active. Being less active can lead to a loss of strength and balance.  · Fear can lead to isolation from others, " "depression, or the use of more medicines or alcohol. And all these things make falling even more likely.  To break the cycle, learn more about ways to avoid falling. As you take control, you may find yourself feeling less afraid.   Date Last Reviewed: 6/12/2015  © 6330-6950 [employer]. [address] [place] [address], [place] [address]. All rights reserved. This information is not intended as a substitute for professional medical care. Always follow your healthcare professional's instructions.        Exercises to Prevent Falls  Certain types of exercises may help make you less likely to fall. Try the ones below. Or do other exercises that your health care provider suggests. Depending on your health, you may need to start slowly. Don't let that stop you. Even small amounts of exercise can help you. Be sure to talk to your health care provider before starting any exercise program.       Improve balance  Many types of exercise can help improve balance. Adrian chi and yoga are good examples. Here's another one to try. You can do it anytime and almost anywhere.  · Stand next to a counter or solid support.  · Push yourself up onto your tiptoes.  · Hold for 5 seconds. If you start to lose your balance, hold on to the counter.  · Rest and repeat 5 times. Work up to holding for 20 to 30 seconds, if you can. Increase flexibility  Being more flexible makes it easier for you to move around safely. Try exercises like the seated hamstring stretch.  · Sit in a chair and put one foot on a stool.  · Straighten your leg and reach with both hands down either side of your leg. Reach as far down your leg as you can.  · Hold for about 20 seconds.  · Go back to the starting position. Then repeat 5 times. Switch legs. Build strength  "Resistance" exercises help build strength. You can do them without equipment. Or you can use weights, elastic bands, or special machines. One such exercise is called the biceps curl. You can hold a 1-pound " weight or even a can of soup. Do this exercise at least 3 times a week. Strive for every day.  · Sit up straight in a chair.  · Keep your elbow close to your body and your wrist straight.  · Bend your arm, moving your hand up to your shoulder. Then slowly lower your arm.  · Repeat 5 times. Switch to the other arm.   Build your staying power  Aerobic exercises make your heart and lungs stronger so you can keep moving longer. Walking and swimming are two of the best types of exercises you can do. Using a stationary bike is great, too. Find an aerobic exercise that you enjoy. Start slowly and build up. Even 5 minutes is helpful. Aim for a goal of 30 minutes, at least 3 times a week. You don't have to do 30 minutes in 1 session. Break it up and walk a little throughout the day.  More helpful tips  · Start easy. Slowly work up to doing more.  · Talk with your health care provider about the best exercises for you.  · Call senior centers or health clubs about exercise programs.  · If needed, have a family member watch you walk every so often to check your stability.  · Exercise with a friend. Choose an activity you both enjoy.  · Consider barbara chi or yoga to strengthen your balance.  · Try exercises that you can do anytime, anywhere. Here are 2 examples. Have someone with you when you first try these:  ¨ Practice walking by placing 1 foot right in front of the other.  ¨ Stand up and sit down 10 times. Repeat this throughout the day.   Date Last Reviewed: 6/13/2015  © 2371-1356 [employer]. [address] [place] [address], [place] [address]. All rights reserved. This information is not intended as a substitute for professional medical care. Always follow your healthcare professional's instructions.

## 2017-11-10 ENCOUNTER — INFUSION (OUTPATIENT)
Dept: INFUSION THERAPY | Facility: HOSPITAL | Age: 72
End: 2017-11-10
Attending: INTERNAL MEDICINE
Payer: MEDICARE

## 2017-11-10 DIAGNOSIS — C50.912 MALIGNANT NEOPLASM OF LEFT FEMALE BREAST, UNSPECIFIED ESTROGEN RECEPTOR STATUS, UNSPECIFIED SITE OF BREAST: Primary | ICD-10-CM

## 2017-11-10 PROCEDURE — 96372 THER/PROPH/DIAG INJ SC/IM: CPT | Mod: PN

## 2017-11-10 PROCEDURE — 63600175 PHARM REV CODE 636 W HCPCS: Mod: PN | Performed by: PHYSICIAN ASSISTANT

## 2017-11-10 RX ORDER — SODIUM CHLORIDE 0.9 % (FLUSH) 0.9 %
10 SYRINGE (ML) INJECTION
Status: CANCELLED
Start: 2017-11-10

## 2017-11-10 RX ORDER — HEPARIN 100 UNIT/ML
500 SYRINGE INTRAVENOUS
Status: CANCELLED
Start: 2017-11-10

## 2017-11-10 RX ADMIN — DENOSUMAB 120 MG: 120 INJECTION SUBCUTANEOUS at 02:11

## 2017-11-10 NOTE — PLAN OF CARE
Problem: Patient Care Overview  Goal: Plan of Care Review  Outcome: Ongoing (interventions implemented as appropriate)  Pt tolerated Xgeva injection well.  Instructed to call MD with any problems.

## 2017-12-07 ENCOUNTER — INFUSION (OUTPATIENT)
Dept: INFUSION THERAPY | Facility: HOSPITAL | Age: 72
End: 2017-12-07
Attending: INTERNAL MEDICINE
Payer: MEDICARE

## 2017-12-07 VITALS
OXYGEN SATURATION: 99 % | SYSTOLIC BLOOD PRESSURE: 128 MMHG | TEMPERATURE: 98 F | WEIGHT: 147 LBS | RESPIRATION RATE: 16 BRPM | BODY MASS INDEX: 25.1 KG/M2 | HEART RATE: 94 BPM | DIASTOLIC BLOOD PRESSURE: 68 MMHG | HEIGHT: 64 IN

## 2017-12-07 DIAGNOSIS — C50.912 MALIGNANT NEOPLASM OF LEFT FEMALE BREAST, UNSPECIFIED ESTROGEN RECEPTOR STATUS, UNSPECIFIED SITE OF BREAST: Primary | ICD-10-CM

## 2017-12-07 PROCEDURE — 96372 THER/PROPH/DIAG INJ SC/IM: CPT | Mod: PN

## 2017-12-07 PROCEDURE — 63600175 PHARM REV CODE 636 W HCPCS: Mod: PN | Performed by: PHYSICIAN ASSISTANT

## 2017-12-07 RX ORDER — SODIUM CHLORIDE 0.9 % (FLUSH) 0.9 %
10 SYRINGE (ML) INJECTION
Status: CANCELLED
Start: 2017-12-08

## 2017-12-07 RX ORDER — HEPARIN 100 UNIT/ML
500 SYRINGE INTRAVENOUS
Status: CANCELLED
Start: 2017-12-08

## 2017-12-07 RX ADMIN — DENOSUMAB 120 MG: 120 INJECTION SUBCUTANEOUS at 02:12

## 2017-12-28 PROBLEM — I95.1 ORTHOSTATIC HYPOTENSION: Status: ACTIVE | Noted: 2017-12-28

## 2017-12-28 PROBLEM — K92.2 UPPER GI BLEED: Status: ACTIVE | Noted: 2017-12-28

## 2018-01-05 ENCOUNTER — INFUSION (OUTPATIENT)
Dept: INFUSION THERAPY | Facility: HOSPITAL | Age: 73
End: 2018-01-05
Attending: PHYSICIAN ASSISTANT
Payer: MEDICARE

## 2018-01-05 VITALS
WEIGHT: 142.69 LBS | RESPIRATION RATE: 16 BRPM | HEIGHT: 63 IN | DIASTOLIC BLOOD PRESSURE: 68 MMHG | BODY MASS INDEX: 25.28 KG/M2 | TEMPERATURE: 98 F | SYSTOLIC BLOOD PRESSURE: 118 MMHG | HEART RATE: 102 BPM

## 2018-01-05 DIAGNOSIS — C50.912 MALIGNANT NEOPLASM OF LEFT FEMALE BREAST, UNSPECIFIED ESTROGEN RECEPTOR STATUS, UNSPECIFIED SITE OF BREAST: Primary | ICD-10-CM

## 2018-01-05 PROCEDURE — 96372 THER/PROPH/DIAG INJ SC/IM: CPT | Mod: PN

## 2018-01-05 PROCEDURE — 63600175 PHARM REV CODE 636 W HCPCS: Mod: TB,PN | Performed by: PHYSICIAN ASSISTANT

## 2018-01-05 RX ORDER — HEPARIN 100 UNIT/ML
500 SYRINGE INTRAVENOUS
Status: CANCELLED
Start: 2018-01-05

## 2018-01-05 RX ORDER — SODIUM CHLORIDE 0.9 % (FLUSH) 0.9 %
10 SYRINGE (ML) INJECTION
Status: CANCELLED
Start: 2018-01-05

## 2018-01-05 RX ADMIN — DENOSUMAB 120 MG: 120 INJECTION SUBCUTANEOUS at 11:01

## 2018-02-01 ENCOUNTER — INFUSION (OUTPATIENT)
Dept: INFUSION THERAPY | Facility: HOSPITAL | Age: 73
End: 2018-02-01
Attending: PHYSICIAN ASSISTANT
Payer: MEDICARE

## 2018-02-01 VITALS
HEART RATE: 90 BPM | HEIGHT: 63 IN | SYSTOLIC BLOOD PRESSURE: 124 MMHG | TEMPERATURE: 98 F | RESPIRATION RATE: 16 BRPM | WEIGHT: 144 LBS | DIASTOLIC BLOOD PRESSURE: 60 MMHG | BODY MASS INDEX: 25.52 KG/M2

## 2018-02-01 DIAGNOSIS — C50.912 MALIGNANT NEOPLASM OF LEFT FEMALE BREAST, UNSPECIFIED ESTROGEN RECEPTOR STATUS, UNSPECIFIED SITE OF BREAST: Primary | ICD-10-CM

## 2018-02-01 PROCEDURE — 63600175 PHARM REV CODE 636 W HCPCS: Mod: TB,PN | Performed by: PHYSICIAN ASSISTANT

## 2018-02-01 PROCEDURE — A4216 STERILE WATER/SALINE, 10 ML: HCPCS | Mod: PN | Performed by: PHYSICIAN ASSISTANT

## 2018-02-01 PROCEDURE — 96523 IRRIG DRUG DELIVERY DEVICE: CPT | Mod: PN

## 2018-02-01 PROCEDURE — 96372 THER/PROPH/DIAG INJ SC/IM: CPT | Mod: PN

## 2018-02-01 PROCEDURE — 25000003 PHARM REV CODE 250: Mod: PN | Performed by: PHYSICIAN ASSISTANT

## 2018-02-01 RX ORDER — SODIUM CHLORIDE 0.9 % (FLUSH) 0.9 %
10 SYRINGE (ML) INJECTION
Status: CANCELLED
Start: 2018-02-02

## 2018-02-01 RX ORDER — SODIUM CHLORIDE 0.9 % (FLUSH) 0.9 %
10 SYRINGE (ML) INJECTION
Status: DISCONTINUED | OUTPATIENT
Start: 2018-02-01 | End: 2018-02-01 | Stop reason: HOSPADM

## 2018-02-01 RX ORDER — HEPARIN 100 UNIT/ML
500 SYRINGE INTRAVENOUS
Status: CANCELLED
Start: 2018-02-02

## 2018-02-01 RX ADMIN — Medication 10 ML: at 04:02

## 2018-02-01 RX ADMIN — DENOSUMAB 120 MG: 120 INJECTION SUBCUTANEOUS at 04:02

## 2018-02-02 NOTE — PLAN OF CARE
Problem: Patient Care Overview  Goal: Plan of Care Review  Outcome: Ongoing (interventions implemented as appropriate)  Tolerated injection and port flush without any c/o.  Reviewed future appointments with pt.   Left infusion center via wheelchair with family.

## 2018-03-01 ENCOUNTER — INFUSION (OUTPATIENT)
Dept: INFUSION THERAPY | Facility: HOSPITAL | Age: 73
End: 2018-03-01
Attending: PHYSICIAN ASSISTANT
Payer: MEDICARE

## 2018-03-01 DIAGNOSIS — C50.912 MALIGNANT NEOPLASM OF LEFT FEMALE BREAST, UNSPECIFIED ESTROGEN RECEPTOR STATUS, UNSPECIFIED SITE OF BREAST: Primary | ICD-10-CM

## 2018-03-01 PROCEDURE — 96372 THER/PROPH/DIAG INJ SC/IM: CPT | Mod: PN

## 2018-03-01 PROCEDURE — 63600175 PHARM REV CODE 636 W HCPCS: Mod: TB,PN | Performed by: PHYSICIAN ASSISTANT

## 2018-03-01 RX ORDER — SODIUM CHLORIDE 0.9 % (FLUSH) 0.9 %
10 SYRINGE (ML) INJECTION
Status: CANCELLED
Start: 2018-03-01

## 2018-03-01 RX ORDER — HEPARIN 100 UNIT/ML
500 SYRINGE INTRAVENOUS
Status: CANCELLED
Start: 2018-03-01

## 2018-03-01 RX ADMIN — DENOSUMAB 120 MG: 120 INJECTION SUBCUTANEOUS at 02:03

## 2018-03-01 NOTE — PLAN OF CARE
Problem: Patient Care Overview  Goal: Plan of Care Review  Outcome: Ongoing (interventions implemented as appropriate)  Tolerated xgeva injection without any c/o; RTC as directed; Verb agreement with plan; amb off unit independently with family to go to dr. temple

## 2018-03-29 ENCOUNTER — INFUSION (OUTPATIENT)
Dept: INFUSION THERAPY | Facility: HOSPITAL | Age: 73
End: 2018-03-29
Attending: PHYSICIAN ASSISTANT
Payer: MEDICARE

## 2018-03-29 VITALS
HEIGHT: 63 IN | DIASTOLIC BLOOD PRESSURE: 62 MMHG | HEART RATE: 93 BPM | SYSTOLIC BLOOD PRESSURE: 134 MMHG | WEIGHT: 144.19 LBS | BODY MASS INDEX: 25.55 KG/M2 | RESPIRATION RATE: 18 BRPM | TEMPERATURE: 97 F

## 2018-03-29 DIAGNOSIS — C50.912 MALIGNANT NEOPLASM OF LEFT FEMALE BREAST, UNSPECIFIED ESTROGEN RECEPTOR STATUS, UNSPECIFIED SITE OF BREAST: Primary | ICD-10-CM

## 2018-03-29 PROCEDURE — 63600175 PHARM REV CODE 636 W HCPCS: Mod: TB,PN | Performed by: PHYSICIAN ASSISTANT

## 2018-03-29 PROCEDURE — 96372 THER/PROPH/DIAG INJ SC/IM: CPT | Mod: PN

## 2018-03-29 RX ORDER — SODIUM CHLORIDE 0.9 % (FLUSH) 0.9 %
10 SYRINGE (ML) INJECTION
Status: CANCELLED
Start: 2018-03-29

## 2018-03-29 RX ORDER — HEPARIN 100 UNIT/ML
500 SYRINGE INTRAVENOUS
Status: CANCELLED
Start: 2018-03-29

## 2018-03-29 RX ADMIN — DENOSUMAB 120 MG: 120 INJECTION SUBCUTANEOUS at 12:03

## 2018-03-29 NOTE — PLAN OF CARE
Problem: Patient Care Overview  Goal: Discharge Needs Assessment  Outcome: Ongoing (interventions implemented as appropriate)  Pt tolerated well   MICK

## 2018-04-26 ENCOUNTER — INFUSION (OUTPATIENT)
Dept: INFUSION THERAPY | Facility: HOSPITAL | Age: 73
End: 2018-04-26
Attending: PHYSICIAN ASSISTANT
Payer: MEDICARE

## 2018-04-26 VITALS
BODY MASS INDEX: 24.8 KG/M2 | HEART RATE: 83 BPM | SYSTOLIC BLOOD PRESSURE: 107 MMHG | DIASTOLIC BLOOD PRESSURE: 55 MMHG | TEMPERATURE: 97 F | HEIGHT: 63 IN | WEIGHT: 140 LBS | RESPIRATION RATE: 16 BRPM

## 2018-04-26 DIAGNOSIS — C50.912 MALIGNANT NEOPLASM OF LEFT FEMALE BREAST, UNSPECIFIED ESTROGEN RECEPTOR STATUS, UNSPECIFIED SITE OF BREAST: Primary | ICD-10-CM

## 2018-04-26 PROCEDURE — 96523 IRRIG DRUG DELIVERY DEVICE: CPT | Mod: PN

## 2018-04-26 PROCEDURE — 25000003 PHARM REV CODE 250: Mod: PN | Performed by: PHYSICIAN ASSISTANT

## 2018-04-26 PROCEDURE — A4216 STERILE WATER/SALINE, 10 ML: HCPCS | Mod: PN | Performed by: PHYSICIAN ASSISTANT

## 2018-04-26 PROCEDURE — 63600175 PHARM REV CODE 636 W HCPCS: Mod: TB,PN | Performed by: PHYSICIAN ASSISTANT

## 2018-04-26 PROCEDURE — 96372 THER/PROPH/DIAG INJ SC/IM: CPT | Mod: PN

## 2018-04-26 RX ORDER — SODIUM CHLORIDE 0.9 % (FLUSH) 0.9 %
10 SYRINGE (ML) INJECTION
Status: DISCONTINUED | OUTPATIENT
Start: 2018-04-26 | End: 2018-04-26 | Stop reason: HOSPADM

## 2018-04-26 RX ORDER — SODIUM CHLORIDE 0.9 % (FLUSH) 0.9 %
10 SYRINGE (ML) INJECTION
Status: CANCELLED
Start: 2018-04-26

## 2018-04-26 RX ORDER — HEPARIN 100 UNIT/ML
500 SYRINGE INTRAVENOUS
Status: CANCELLED
Start: 2018-04-26

## 2018-04-26 RX ADMIN — Medication 10 ML: at 03:04

## 2018-04-26 RX ADMIN — DENOSUMAB 120 MG: 120 INJECTION SUBCUTANEOUS at 03:04

## 2018-04-26 NOTE — PLAN OF CARE
"Problem: Patient Care Overview  Goal: Discharge Needs Assessment  Tolerated injection without any distress.  Reviewed upcoming appointments.  Amb off unit independently by self.Hand hygiene performed before patient contact and Verified the correct patient using two identifiers. Assisted patient to a comfortable position and instruct the patient to turn his or her head away from the port during the procedure.  Left  Port accessed using aseptic technique- using Rg needle 20g  1 "-positive  blood return noted- flushed with 10ccNS    Port deaccessed and bandaid applied. Patient tolerated procedure without noted or reported distress.       "

## 2018-05-24 ENCOUNTER — INFUSION (OUTPATIENT)
Dept: INFUSION THERAPY | Facility: HOSPITAL | Age: 73
End: 2018-05-24
Attending: PHYSICIAN ASSISTANT
Payer: MEDICARE

## 2018-05-24 VITALS
WEIGHT: 141 LBS | DIASTOLIC BLOOD PRESSURE: 66 MMHG | HEIGHT: 63 IN | RESPIRATION RATE: 16 BRPM | BODY MASS INDEX: 24.98 KG/M2 | HEART RATE: 76 BPM | TEMPERATURE: 99 F | SYSTOLIC BLOOD PRESSURE: 132 MMHG

## 2018-05-24 DIAGNOSIS — C50.912 MALIGNANT NEOPLASM OF LEFT FEMALE BREAST, UNSPECIFIED ESTROGEN RECEPTOR STATUS, UNSPECIFIED SITE OF BREAST: Primary | ICD-10-CM

## 2018-05-24 PROCEDURE — 96372 THER/PROPH/DIAG INJ SC/IM: CPT | Mod: PN

## 2018-05-24 PROCEDURE — 63600175 PHARM REV CODE 636 W HCPCS: Mod: TB,PN | Performed by: PHYSICIAN ASSISTANT

## 2018-05-24 RX ORDER — SODIUM CHLORIDE 0.9 % (FLUSH) 0.9 %
10 SYRINGE (ML) INJECTION
Status: CANCELLED
Start: 2018-05-24

## 2018-05-24 RX ORDER — HEPARIN 100 UNIT/ML
500 SYRINGE INTRAVENOUS
Status: CANCELLED
Start: 2018-05-24

## 2018-05-24 RX ADMIN — DENOSUMAB 120 MG: 120 INJECTION SUBCUTANEOUS at 04:05

## 2018-06-21 ENCOUNTER — INFUSION (OUTPATIENT)
Dept: INFUSION THERAPY | Facility: HOSPITAL | Age: 73
End: 2018-06-21
Attending: PHYSICIAN ASSISTANT
Payer: MEDICARE

## 2018-06-21 VITALS
RESPIRATION RATE: 16 BRPM | DIASTOLIC BLOOD PRESSURE: 64 MMHG | TEMPERATURE: 98 F | SYSTOLIC BLOOD PRESSURE: 139 MMHG | BODY MASS INDEX: 25.16 KG/M2 | HEART RATE: 84 BPM | HEIGHT: 63 IN | WEIGHT: 142 LBS

## 2018-06-21 DIAGNOSIS — C50.912 MALIGNANT NEOPLASM OF LEFT FEMALE BREAST, UNSPECIFIED ESTROGEN RECEPTOR STATUS, UNSPECIFIED SITE OF BREAST: Primary | ICD-10-CM

## 2018-06-21 PROCEDURE — 63600175 PHARM REV CODE 636 W HCPCS: Mod: TB,PN | Performed by: PHYSICIAN ASSISTANT

## 2018-06-21 PROCEDURE — 96372 THER/PROPH/DIAG INJ SC/IM: CPT | Mod: PN

## 2018-06-21 RX ORDER — SODIUM CHLORIDE 0.9 % (FLUSH) 0.9 %
10 SYRINGE (ML) INJECTION
Status: CANCELLED
Start: 2018-06-21

## 2018-06-21 RX ORDER — HEPARIN 100 UNIT/ML
500 SYRINGE INTRAVENOUS
Status: CANCELLED
Start: 2018-06-21

## 2018-06-21 RX ADMIN — DENOSUMAB 120 MG: 120 INJECTION SUBCUTANEOUS at 09:06

## 2018-07-19 ENCOUNTER — INFUSION (OUTPATIENT)
Dept: INFUSION THERAPY | Facility: HOSPITAL | Age: 73
End: 2018-07-19
Attending: PHYSICIAN ASSISTANT
Payer: MEDICARE

## 2018-07-19 VITALS
BODY MASS INDEX: 24.93 KG/M2 | HEIGHT: 63 IN | DIASTOLIC BLOOD PRESSURE: 86 MMHG | WEIGHT: 140.69 LBS | RESPIRATION RATE: 16 BRPM | HEART RATE: 73 BPM | TEMPERATURE: 98 F | SYSTOLIC BLOOD PRESSURE: 136 MMHG

## 2018-07-19 DIAGNOSIS — C50.912 MALIGNANT NEOPLASM OF LEFT FEMALE BREAST, UNSPECIFIED ESTROGEN RECEPTOR STATUS, UNSPECIFIED SITE OF BREAST: Primary | ICD-10-CM

## 2018-07-19 PROCEDURE — 96372 THER/PROPH/DIAG INJ SC/IM: CPT | Mod: PN,59

## 2018-07-19 PROCEDURE — 96361 HYDRATE IV INFUSION ADD-ON: CPT | Mod: PN

## 2018-07-19 PROCEDURE — A4216 STERILE WATER/SALINE, 10 ML: HCPCS | Mod: PN | Performed by: INTERNAL MEDICINE

## 2018-07-19 PROCEDURE — 96360 HYDRATION IV INFUSION INIT: CPT | Mod: PN

## 2018-07-19 PROCEDURE — 96523 IRRIG DRUG DELIVERY DEVICE: CPT | Mod: PN

## 2018-07-19 PROCEDURE — A4216 STERILE WATER/SALINE, 10 ML: HCPCS | Mod: PN | Performed by: PHYSICIAN ASSISTANT

## 2018-07-19 PROCEDURE — 25000003 PHARM REV CODE 250: Mod: PN | Performed by: INTERNAL MEDICINE

## 2018-07-19 PROCEDURE — 25000003 PHARM REV CODE 250: Mod: PN | Performed by: PHYSICIAN ASSISTANT

## 2018-07-19 PROCEDURE — 63600175 PHARM REV CODE 636 W HCPCS: Mod: TB,PN | Performed by: PHYSICIAN ASSISTANT

## 2018-07-19 RX ORDER — HEPARIN 100 UNIT/ML
500 SYRINGE INTRAVENOUS
Status: CANCELLED | OUTPATIENT
Start: 2018-08-16

## 2018-07-19 RX ORDER — HEPARIN 100 UNIT/ML
500 SYRINGE INTRAVENOUS
Status: CANCELLED
Start: 2018-08-16

## 2018-07-19 RX ORDER — SODIUM CHLORIDE 0.9 % (FLUSH) 0.9 %
10 SYRINGE (ML) INJECTION
Status: CANCELLED
Start: 2018-07-19

## 2018-07-19 RX ORDER — SODIUM CHLORIDE 0.9 % (FLUSH) 0.9 %
10 SYRINGE (ML) INJECTION
Status: CANCELLED
Start: 2018-08-16

## 2018-07-19 RX ORDER — SODIUM CHLORIDE 0.9 % (FLUSH) 0.9 %
10 SYRINGE (ML) INJECTION
Status: ACTIVE | OUTPATIENT
Start: 2018-07-19

## 2018-07-19 RX ORDER — SODIUM CHLORIDE 0.9 % (FLUSH) 0.9 %
10 SYRINGE (ML) INJECTION
Status: DISCONTINUED | OUTPATIENT
Start: 2018-07-19 | End: 2018-07-19 | Stop reason: HOSPADM

## 2018-07-19 RX ORDER — HEPARIN 100 UNIT/ML
500 SYRINGE INTRAVENOUS
Status: CANCELLED
Start: 2018-07-19

## 2018-07-19 RX ORDER — SODIUM CHLORIDE 0.9 % (FLUSH) 0.9 %
10 SYRINGE (ML) INJECTION
Status: CANCELLED | OUTPATIENT
Start: 2018-08-16

## 2018-07-19 RX ADMIN — DENOSUMAB 120 MG: 120 INJECTION SUBCUTANEOUS at 09:07

## 2018-07-19 RX ADMIN — SODIUM CHLORIDE, PRESERVATIVE FREE 10 ML: 5 INJECTION INTRAVENOUS at 09:07

## 2018-07-19 RX ADMIN — SODIUM CHLORIDE 1000 ML: 0.9 INJECTION, SOLUTION INTRAVENOUS at 11:07

## 2018-07-19 RX ADMIN — SODIUM CHLORIDE, PRESERVATIVE FREE 10 ML: 5 INJECTION INTRAVENOUS at 11:07

## 2018-07-19 NOTE — PLAN OF CARE
Problem: Patient Care Overview  Goal: Plan of Care Review  Outcome: Ongoing (interventions implemented as appropriate)  Pt tolerated injection and port well without complaints. Pt with elevated BP, but going see Dr. Bernardo post injection and port flush appointment. Pt asymptomatic at this time. AVS printed and reviewed. Pt verbalized understanding. D/C to Dr Bernardo appintment.

## 2018-07-19 NOTE — PATIENT INSTRUCTIONS
"  Preventing Falls: Are You At Risk of Falling?     Ask for help to reduce risk of falling in your home.     As you get older, you're not as steady on your feet as you once were. And you may have health problems you didn't have when you were younger. So, it's not surprising that older people are more likely to trip and fall. Falling can be very serious. It can change your overall health and quality of life. That's why it's important to be aware of your own risk of falling.  The dangers of falling  Falls are one of the main causes of injury in people over age 65. An older person who falls may take longer to get better than a younger person. And, after a fall, an older person is more likely to have problems that don't go away. So, preventing falls can help you avoid serious health problems.  Are you at risk of falling?  Answer these questions to rate your level of risk.  · Are you a woman?  · Have you fallen or stumbled in the last year?  · Are you over age 65?  · Are you ever dizzy or lightheaded with standing?  · Do you have a hard time getting in and out of the bathtub or on and off the toilet?  · Do you lean on objects to help you get around? Or do you use a cane or walker?  · Do you have vision or hearing problems? For example, do you need new glasses or hearing aids?  · Do you have 2 or more long-lasting (chronic) medical conditions?  · Do you take 3 or more medicines?  · Have you felt depressed recently?  · Have you had more trouble with your memory in recent months?  · Are there hazards in your home that might cause you to fall, such as loose rugs or poor lighting?  · Do you have a pet that jumps on you or might trip you?  · Have you stopped getting regular exercise?  · Do you have diabetes?   · Do you have a neurologic disease, such as Parkinson or Alzheimer disease?   · Do you drink alcohol?  · Do you wear athletic shoes or slippers, or go barefoot at home?  You can help prevent falls  If you answered "yes" " to any of the above questions, you should take steps to reduce your risk of a fall. Monitoring health conditions and keeping walkways in your home free of clutter are just 2 ways. Changing is sometimes easier said than done. But keep in mind that even small changes can make you less likely to fall.  The fear of falling  It's normal to be scared of falling, especially if you've fallen before. But being afraid can actually make you more likely to fall. This is because:  · Fear might cause you to become less active. Being less active can lead to a loss of strength and balance.  · Fear can lead to isolation from others, depression, or the use of more medicines or alcohol. And all these things make falling even more likely.  To break the cycle, learn more about ways to avoid falling. As you take control, you may find yourself feeling less afraid.   Date Last Reviewed: 6/12/2015  © 2536-1173 [employer]. [address] [place] [address]. All rights reserved. This information is not intended as a substitute for professional medical care. Always follow your healthcare professional's instructions.        Denosumab injection  What is this medicine?  DENOSUMAB (den oh john mab) slows bone breakdown. Prolia is used to treat osteoporosis in women after menopause and in men. Xgeva is used to prevent bone fractures and other bone problems caused by cancer bone metastases. Xgeva is also used to treat giant cell tumor of the bone.  How should I use this medicine?  This medicine is for injection under the skin. It is given by a health care professional in a hospital or clinic setting.  If you are getting Prolia, a special MedGuide will be given to you by the pharmacist with each prescription and refill. Be sure to read this information carefully each time.  For Prolia, talk to your pediatrician regarding the use of this medicine in children. Special care may be needed. For Xgeva, talk to your pediatrician  regarding the use of this medicine in children. While this drug may be prescribed for children as young as 13 years for selected conditions, precautions do apply.  What side effects may I notice from receiving this medicine?  Side effects that you should report to your doctor or health care professional as soon as possible:  · allergic reactions like skin rash, itching or hives, swelling of the face, lips, or tongue  · breathing problems  · chest pain  · fast, irregular heartbeat  · feeling faint or lightheaded, falls  · fever, chills, or any other sign of infection  · muscle spasms, tightening, or twitches  · numbness or tingling  · skin blisters or bumps, or is dry, peels, or red  · slow healing or unexplained pain in the mouth or jaw  · unusual bleeding or bruising  Side effects that usually do not require medical attention (Report these to your doctor or health care professional if they continue or are bothersome.):  · muscle pain  · stomach upset, gas  What may interact with this medicine?  Do not take this medicine with any of the following medications:  · other medicines containing denosumab  This medicine may also interact with the following medications:  · medicines that suppress the immune system  · medicines that treat cancer  · steroid medicines like prednisone or cortisone  What if I miss a dose?  It is important not to miss your dose. Call your doctor or health care professional if you are unable to keep an appointment.  Where should I keep my medicine?  This medicine is only given in a clinic, doctor's office, or other health care setting and will not be stored at home.  What should I tell my health care provider before I take this medicine?  They need to know if you have any of these conditions:  · dental disease  · eczema  · infection or history of infections  · kidney disease or on dialysis  · low blood calcium or vitamin D  · malabsorption syndrome  · scheduled to have surgery or tooth  extraction  · taking medicine that contains denosumab  · thyroid or parathyroid disease  · an unusual reaction to denosumab, other medicines, foods, dyes, or preservatives  · pregnant or trying to get pregnant  · breast-feeding  What should I watch for while using this medicine?  Visit your doctor or health care professional for regular checks on your progress. Your doctor or health care professional may order blood tests and other tests to see how you are doing.  Call your doctor or health care professional if you get a cold or other infection while receiving this medicine. Do not treat yourself. This medicine may decrease your body's ability to fight infection.  You should make sure you get enough calcium and vitamin D while you are taking this medicine, unless your doctor tells you not to. Discuss the foods you eat and the vitamins you take with your health care professional.  See your dentist regularly. Brush and floss your teeth as directed. Before you have any dental work done, tell your dentist you are receiving this medicine.  Do not become pregnant while taking this medicine or for 5 months after stopping it. Women should inform their doctor if they wish to become pregnant or think they might be pregnant. There is a potential for serious side effects to an unborn child. Talk to your health care professional or pharmacist for more information.  NOTE:This sheet is a summary. It may not cover all possible information. If you have questions about this medicine, talk to your doctor, pharmacist, or health care provider. Copyright© 2017 [employer]        Central Line Infections     Good handwashing helps prevent central line infections.   You need a central line as part of your treatment. Its also called a central venous access device (CVAD) or central venous catheter (CVC). A small, soft tube called a catheter is put in a vein that leads to your heart. The central line is used instead of a standard IV  (intravenous) line. It does not need to be replaced as often as a standard IV. This means less pain and fewer needlesticks during treatment. But central lines come with a risk of infection. This sheet tells you more about central line infections and what hospitals are doing to prevent them. And it explains how an infection is treated, if one occurs.  Types of central lines  With a central line, a catheter is inserted into your body through a vein that leads to the large vein near the heart (vena cava). Types of central lines and their risk of infection are listed below. Which type is best for you depends on your needs and your overall health. Your healthcare provider can tell you which type of line you need, and why.  · Peripherally inserted central catheter (PICC). This is placed in a large vein in the upper arm, or near the bend of the elbow.  · Subclavian line. This is placed in a vein that runs behind the collarbone.  · Internal jugular line. This is placed into a large vein in the neck. Infection risk is higher than with a PICC or subclavian line, but lower than with a femoral line.  · Femoral line. This may be placed in a large vein in the groin. This site is generally not used because of an increased risk for infection.   · Tunneled catheter. This is run through the soft tissue under the skin before it enters a vein. A small cuff helps hold the catheter in place. Both the tunnel and the cuff help prevent infection. This type of catheter may be placed in any of the above locations.  · Port. This small device is placed completely under the skin on the arm or chest. Its connected to a catheter that is threaded into the vena cava.  Types of infections  A central line provides a direct path into your bloodstream. This gives germs possible access into your body. All types of central lines are associated with some risk of infection. Often, the germs that cause a central line infection come from your own skin. There  are 2 possible types of infection:  · Local infection. This can occur where the central line enters your body. Symptoms include redness, pain, or swelling at or near the catheter site, pain or tenderness along the path of the catheter, and drainage from the skin around the catheter.  · Systemic infection(also called bacteremia). This can occur if germs get into the bloodstream. This is very serious and can be fatal. Symptoms include sudden fever, shaking chills, a racing heartbeat, confusion, change in behavior, and a skin rash.  Risk factors for infection  Anyone who has a central line can get an infection. Your risk is higher if you:  · Are in the intensive care unit (ICU).  · Have a weakened immune system or serious illness.  · Are receiving bone marrow or chemotherapy.  · Have the line for an extended time.  · Have a central line in your neck or groin.  How central line infections are treated  Treatment depends on the type of central line, how severe the infection is, and your overall health. Your healthcare provider will prescribe antibiotics to fight the infection. The line may also need to be removed. In some cases, the line is flushed with high doses of antibiotics. This may kill the germs causing the infection, so the line doesnt have to be removed.  What hospitals do to prevent infection  Hospitals have a plan to reduce central line infections. This plan includes:  · Good hand hygiene. Hospital staff clean their hands before and after touching the line. They wash their hands with soap and water. Or they use an alcohol-based hand  containing at least 60% alcohol.  · Using sterile practices during placement. The healthcare worker who places the line wears germ-free (sterile) clothing including a long-sleeved gown and gloves. Before the line is placed, your skin is cleaned with an antiseptic solution. During placement, you are fully covered with a large sterile sheet (a sterile drape). Only the spot  where the line is placed is exposed. After placement, the site where the line enters the body is covered with a sterile bandage (dressing).  · Choosing a lower-risk vein. Whenever possible, the line is placed in the vein that's right for your treatment and has the lowest infection risk. Some hospitals use lines coated with an antiseptic to reduce the chance of infection.  · Checking for infection. The line is checked frequently for infection. It is removed as soon as you no longer need it.  What you can do to prevent infection  Before you get a central line, ask questions. Find out why you need the line and where it will be placed. Learn what steps the hospital is taking to reduce your infection risk. Once the line has been placed, you, your caretakers, and any visitors can help prevent infection by doing the following:  · Use good hand hygiene. Wash your hands often with soap and water, and use alcohol-based hand gel as directed. To clean your hands effectively, follow the guidelines on this sheet. Visitors should wash hands well when they arrive and when they leave.  · Make sure healthcare staff clean their hands. They should use soap and water or an alcohol-based hand  before and after checking the line. Dont be afraid to remind them.  · Keep the line dry. Follow your providers guidelines for showering. If the dressing does get wet, tell your healthcare provider right away.  · Dont touch the line. Even when your hands are clean, try not to touch the catheter or dressing.  · Learn the sterile dressing technique. This is important if you will be caring for the line at home. Your provider can show you what to do.  Risk for blood clot  If a blood clot forms it can block blood flow through the vein where the catheter is placed. Signs of a blood clot include pain or swelling in the neck, face, chest, or arm. If you have any of these symptoms, call your healthcare provider right away. You may need an  ultrasound exam to locate the blood clot and receive treatment with a blood thinner.    How to wash your hands  To protect the central line from germs, its very important to wash your hands often and clean them well. You and anyone who comes in contact with you should follow these steps:  · Wet your hands with warm water. (Avoid hot water. It can cause skin irritation when you wash your hands often.)  · Apply enough soap to cover the entire surface of your hands, including your fingers.  · Rub your hands together briskly for at least 15 seconds. Make sure to rub the front and back of each hand up to the wrist, your fingers and fingernails, between the fingers, and each thumb.  · Rinse your hands with warm water.  · Dry your hands completely with a new, unused paper towel. Dont use a cloth towel or other reusable towel. These can harbor germs.  · Use the paper towel to turn off the faucet, then throw it away. If youre in a bathroom, also use a paper towel to open the door instead of touching the handle.  Using alcohol-based hand gels  When you dont have access to soap and water, alcohol-based hand gels are a good choice for cleaning your hands. The gel should have at least 60% alcohol. Note that some germs can't be killed by alcohol. Your healthcare team can answer any questions you have about when to use hand gel, or when its better to wash with soap and water. Follow these steps:  · Spread about 1 tablespoon of gel in the palm of one hand. (Check the package for specific guidelines.)  · Rub your hands together briskly. Clean the backs of your hands, the palms, between your fingers, and up your wrists.  · Rub until the gel is gone and your hands are completely dry.   When to seek medical care  Call your healthcare provider right away if you have a central line and develop any of the following:  · Pain or burning in your shoulder, chest, back, arm, or leg  · Fever of 100.4°F (38.0°C) or higher  · Chills  · Signs  of infection at the catheter site (pain, redness, drainage, burning, or stinging)  · Coughing, wheezing, or shortness of breath  · A racing or irregular heartbeat  · Muscle stiffness or trouble moving  · Gurgling noises coming from the catheter  · The catheter falls out, breaks, cracks, leaks, or has other damage  Date Last Reviewed: 7/1/2016  © 9606-7608 [employer]. [address] [place] [address], [place] [address]. All rights reserved. This information is not intended as a substitute for professional medical care. Always follow your healthcare professional's instructions.

## 2018-08-16 ENCOUNTER — INFUSION (OUTPATIENT)
Dept: INFUSION THERAPY | Facility: HOSPITAL | Age: 73
End: 2018-08-16
Attending: PHYSICIAN ASSISTANT
Payer: MEDICARE

## 2018-08-16 VITALS
SYSTOLIC BLOOD PRESSURE: 159 MMHG | RESPIRATION RATE: 18 BRPM | HEART RATE: 96 BPM | WEIGHT: 142.44 LBS | HEIGHT: 63 IN | TEMPERATURE: 98 F | DIASTOLIC BLOOD PRESSURE: 69 MMHG | BODY MASS INDEX: 25.24 KG/M2

## 2018-08-16 DIAGNOSIS — C50.912 MALIGNANT NEOPLASM OF LEFT FEMALE BREAST, UNSPECIFIED ESTROGEN RECEPTOR STATUS, UNSPECIFIED SITE OF BREAST: Primary | ICD-10-CM

## 2018-08-16 PROCEDURE — 96372 THER/PROPH/DIAG INJ SC/IM: CPT | Mod: PN

## 2018-08-16 PROCEDURE — 63600175 PHARM REV CODE 636 W HCPCS: Mod: TB,PN | Performed by: PHYSICIAN ASSISTANT

## 2018-08-16 RX ORDER — SODIUM CHLORIDE 0.9 % (FLUSH) 0.9 %
10 SYRINGE (ML) INJECTION
Status: CANCELLED | OUTPATIENT
Start: 2018-08-16

## 2018-08-16 RX ORDER — SODIUM CHLORIDE 0.9 % (FLUSH) 0.9 %
10 SYRINGE (ML) INJECTION
Status: CANCELLED
Start: 2018-08-16

## 2018-08-16 RX ORDER — HEPARIN 100 UNIT/ML
500 SYRINGE INTRAVENOUS
Status: CANCELLED | OUTPATIENT
Start: 2018-08-16

## 2018-08-16 RX ORDER — HEPARIN 100 UNIT/ML
500 SYRINGE INTRAVENOUS
Status: CANCELLED
Start: 2018-08-16

## 2018-08-16 RX ADMIN — DENOSUMAB 120 MG: 120 INJECTION SUBCUTANEOUS at 12:08

## 2018-08-16 NOTE — PLAN OF CARE
Problem: Patient Care Overview  Goal: Plan of Care Review  Outcome: Ongoing (interventions implemented as appropriate)  Pt tolerated injection well without complaints. AVS printed and reviewed. Pt verbalized understanding. D/C to home with steady gait. Port not due to be flushed, was flushed 7/19/18.

## 2018-09-06 PROBLEM — R07.9 CHEST PAIN: Status: ACTIVE | Noted: 2018-09-06

## 2018-09-13 ENCOUNTER — INFUSION (OUTPATIENT)
Dept: INFUSION THERAPY | Facility: HOSPITAL | Age: 73
End: 2018-09-13
Attending: PHYSICIAN ASSISTANT
Payer: MEDICARE

## 2018-09-13 VITALS
SYSTOLIC BLOOD PRESSURE: 103 MMHG | HEART RATE: 85 BPM | DIASTOLIC BLOOD PRESSURE: 52 MMHG | TEMPERATURE: 98 F | RESPIRATION RATE: 18 BRPM

## 2018-09-13 DIAGNOSIS — C50.912 MALIGNANT NEOPLASM OF LEFT FEMALE BREAST, UNSPECIFIED ESTROGEN RECEPTOR STATUS, UNSPECIFIED SITE OF BREAST: Primary | ICD-10-CM

## 2018-09-13 PROCEDURE — 96372 THER/PROPH/DIAG INJ SC/IM: CPT | Mod: PN

## 2018-09-13 PROCEDURE — 63600175 PHARM REV CODE 636 W HCPCS: Mod: TB,PN | Performed by: PHYSICIAN ASSISTANT

## 2018-09-13 RX ORDER — SODIUM CHLORIDE 0.9 % (FLUSH) 0.9 %
10 SYRINGE (ML) INJECTION
Status: CANCELLED
Start: 2018-09-13

## 2018-09-13 RX ORDER — HEPARIN 100 UNIT/ML
500 SYRINGE INTRAVENOUS
Status: CANCELLED | OUTPATIENT
Start: 2018-09-13

## 2018-09-13 RX ORDER — HEPARIN 100 UNIT/ML
500 SYRINGE INTRAVENOUS
Status: CANCELLED
Start: 2018-09-13

## 2018-09-13 RX ORDER — SODIUM CHLORIDE 0.9 % (FLUSH) 0.9 %
10 SYRINGE (ML) INJECTION
Status: CANCELLED | OUTPATIENT
Start: 2018-09-13

## 2018-09-13 RX ADMIN — DENOSUMAB 120 MG: 120 INJECTION SUBCUTANEOUS at 12:09

## 2018-09-18 PROBLEM — I24.9 ACS (ACUTE CORONARY SYNDROME): Status: ACTIVE | Noted: 2018-09-18

## 2018-09-18 PROBLEM — N18.30 CKD (CHRONIC KIDNEY DISEASE) STAGE 3, GFR 30-59 ML/MIN: Status: ACTIVE | Noted: 2018-09-18

## 2018-10-03 PROBLEM — Z95.5 STATUS POST INSERTION OF DRUG-ELUTING STENT INTO LEFT ANTERIOR DESCENDING (LAD) ARTERY: Status: ACTIVE | Noted: 2018-10-03

## 2018-10-03 PROBLEM — Z95.5 S/P RIGHT CORONARY ARTERY (RCA) STENT PLACEMENT: Status: ACTIVE | Noted: 2018-10-03

## 2018-10-03 PROBLEM — R07.9 CHEST PAIN: Status: RESOLVED | Noted: 2018-09-06 | Resolved: 2018-10-03

## 2018-10-16 ENCOUNTER — INFUSION (OUTPATIENT)
Dept: INFUSION THERAPY | Facility: HOSPITAL | Age: 73
End: 2018-10-16
Attending: PHYSICIAN ASSISTANT
Payer: MEDICARE

## 2018-10-16 DIAGNOSIS — C50.912 MALIGNANT NEOPLASM OF LEFT FEMALE BREAST, UNSPECIFIED ESTROGEN RECEPTOR STATUS, UNSPECIFIED SITE OF BREAST: Primary | ICD-10-CM

## 2018-10-16 PROCEDURE — 96372 THER/PROPH/DIAG INJ SC/IM: CPT | Mod: PN

## 2018-10-16 PROCEDURE — 63600175 PHARM REV CODE 636 W HCPCS: Mod: TB,PN | Performed by: PHYSICIAN ASSISTANT

## 2018-10-16 RX ORDER — SODIUM CHLORIDE 0.9 % (FLUSH) 0.9 %
10 SYRINGE (ML) INJECTION
Status: CANCELLED | OUTPATIENT
Start: 2018-10-16

## 2018-10-16 RX ORDER — HEPARIN 100 UNIT/ML
500 SYRINGE INTRAVENOUS
Status: CANCELLED
Start: 2018-10-16

## 2018-10-16 RX ORDER — HEPARIN 100 UNIT/ML
500 SYRINGE INTRAVENOUS
Status: CANCELLED | OUTPATIENT
Start: 2018-10-16

## 2018-10-16 RX ORDER — SODIUM CHLORIDE 0.9 % (FLUSH) 0.9 %
10 SYRINGE (ML) INJECTION
Status: CANCELLED
Start: 2018-10-16

## 2018-10-16 RX ADMIN — DENOSUMAB 120 MG: 120 INJECTION SUBCUTANEOUS at 10:10

## 2018-11-16 ENCOUNTER — INFUSION (OUTPATIENT)
Dept: INFUSION THERAPY | Facility: HOSPITAL | Age: 73
End: 2018-11-16
Attending: PHYSICIAN ASSISTANT
Payer: MEDICARE

## 2018-11-16 DIAGNOSIS — C50.912 MALIGNANT NEOPLASM OF LEFT FEMALE BREAST, UNSPECIFIED ESTROGEN RECEPTOR STATUS, UNSPECIFIED SITE OF BREAST: Primary | ICD-10-CM

## 2018-11-16 PROCEDURE — 63600175 PHARM REV CODE 636 W HCPCS: Mod: TB,PN | Performed by: PHYSICIAN ASSISTANT

## 2018-11-16 PROCEDURE — 96372 THER/PROPH/DIAG INJ SC/IM: CPT | Mod: PN

## 2018-11-16 PROCEDURE — 25000003 PHARM REV CODE 250: Mod: PN | Performed by: INTERNAL MEDICINE

## 2018-11-16 PROCEDURE — A4216 STERILE WATER/SALINE, 10 ML: HCPCS | Mod: PN | Performed by: INTERNAL MEDICINE

## 2018-11-16 RX ORDER — SODIUM CHLORIDE 0.9 % (FLUSH) 0.9 %
10 SYRINGE (ML) INJECTION
Status: CANCELLED | OUTPATIENT
Start: 2018-11-16

## 2018-11-16 RX ORDER — HEPARIN 100 UNIT/ML
500 SYRINGE INTRAVENOUS
Status: CANCELLED | OUTPATIENT
Start: 2018-11-16

## 2018-11-16 RX ORDER — SODIUM CHLORIDE 0.9 % (FLUSH) 0.9 %
10 SYRINGE (ML) INJECTION
Status: DISCONTINUED | OUTPATIENT
Start: 2018-11-16 | End: 2018-11-16 | Stop reason: HOSPADM

## 2018-11-16 RX ADMIN — DENOSUMAB 120 MG: 120 INJECTION SUBCUTANEOUS at 01:11

## 2018-12-07 PROBLEM — G45.9 TIA (TRANSIENT ISCHEMIC ATTACK): Status: ACTIVE | Noted: 2018-12-07

## 2018-12-07 PROBLEM — I16.0 HYPERTENSIVE URGENCY: Status: ACTIVE | Noted: 2018-12-07

## 2018-12-07 PROBLEM — E78.5 DYSLIPIDEMIA: Status: ACTIVE | Noted: 2018-12-07

## 2018-12-08 PROBLEM — G31.84 MILD COGNITIVE IMPAIRMENT: Status: ACTIVE | Noted: 2018-12-08

## 2018-12-09 PROBLEM — Z75.8 DISCHARGE PLANNING ISSUES: Status: ACTIVE | Noted: 2018-12-09

## 2018-12-14 ENCOUNTER — INFUSION (OUTPATIENT)
Dept: INFUSION THERAPY | Facility: HOSPITAL | Age: 73
End: 2018-12-14
Attending: PHYSICIAN ASSISTANT
Payer: MEDICARE

## 2018-12-14 VITALS
DIASTOLIC BLOOD PRESSURE: 72 MMHG | TEMPERATURE: 98 F | RESPIRATION RATE: 20 BRPM | HEART RATE: 70 BPM | SYSTOLIC BLOOD PRESSURE: 138 MMHG

## 2018-12-14 DIAGNOSIS — C50.912 MALIGNANT NEOPLASM OF LEFT FEMALE BREAST, UNSPECIFIED ESTROGEN RECEPTOR STATUS, UNSPECIFIED SITE OF BREAST: Primary | ICD-10-CM

## 2018-12-14 PROCEDURE — 96372 THER/PROPH/DIAG INJ SC/IM: CPT | Mod: PN

## 2018-12-14 PROCEDURE — 63600175 PHARM REV CODE 636 W HCPCS: Mod: TB,PN | Performed by: NURSE PRACTITIONER

## 2018-12-14 RX ORDER — HEPARIN 100 UNIT/ML
500 SYRINGE INTRAVENOUS
Status: CANCELLED | OUTPATIENT
Start: 2018-12-14

## 2018-12-14 RX ORDER — HEPARIN 100 UNIT/ML
500 SYRINGE INTRAVENOUS
Status: CANCELLED
Start: 2018-12-14

## 2018-12-14 RX ORDER — SODIUM CHLORIDE 0.9 % (FLUSH) 0.9 %
10 SYRINGE (ML) INJECTION
Status: CANCELLED | OUTPATIENT
Start: 2018-12-14

## 2018-12-14 RX ORDER — SODIUM CHLORIDE 0.9 % (FLUSH) 0.9 %
10 SYRINGE (ML) INJECTION
Status: CANCELLED
Start: 2018-12-14

## 2018-12-14 RX ADMIN — DENOSUMAB 120 MG: 120 INJECTION SUBCUTANEOUS at 09:12

## 2019-01-10 ENCOUNTER — TELEPHONE (OUTPATIENT)
Dept: INFUSION THERAPY | Facility: HOSPITAL | Age: 74
End: 2019-01-10

## 2019-01-10 NOTE — TELEPHONE ENCOUNTER
----- Message from Sadaf Oropeza sent at 1/10/2019  8:51 AM CST -----  Caller:  gustavo Matta    Callback number:   [phone]     Reason: Please call to reschedule.  Thanks!

## 2019-01-16 ENCOUNTER — INFUSION (OUTPATIENT)
Dept: INFUSION THERAPY | Facility: HOSPITAL | Age: 74
End: 2019-01-16
Attending: INTERNAL MEDICINE
Payer: MEDICARE

## 2019-01-16 VITALS
TEMPERATURE: 98 F | RESPIRATION RATE: 18 BRPM | SYSTOLIC BLOOD PRESSURE: 143 MMHG | HEIGHT: 63 IN | BODY MASS INDEX: 26.67 KG/M2 | DIASTOLIC BLOOD PRESSURE: 71 MMHG | HEART RATE: 67 BPM | WEIGHT: 150.5 LBS

## 2019-01-16 DIAGNOSIS — C50.912 MALIGNANT NEOPLASM OF LEFT FEMALE BREAST, UNSPECIFIED ESTROGEN RECEPTOR STATUS, UNSPECIFIED SITE OF BREAST: Primary | ICD-10-CM

## 2019-01-16 PROCEDURE — 96372 THER/PROPH/DIAG INJ SC/IM: CPT | Mod: PN

## 2019-01-16 PROCEDURE — 63600175 PHARM REV CODE 636 W HCPCS: Mod: JG,PN | Performed by: NURSE PRACTITIONER

## 2019-01-16 RX ORDER — SODIUM CHLORIDE 0.9 % (FLUSH) 0.9 %
10 SYRINGE (ML) INJECTION
Status: CANCELLED | OUTPATIENT
Start: 2019-01-16

## 2019-01-16 RX ORDER — SODIUM CHLORIDE 0.9 % (FLUSH) 0.9 %
10 SYRINGE (ML) INJECTION
Status: CANCELLED
Start: 2019-01-16

## 2019-01-16 RX ORDER — HEPARIN 100 UNIT/ML
500 SYRINGE INTRAVENOUS
Status: CANCELLED
Start: 2019-01-16

## 2019-01-16 RX ORDER — HEPARIN 100 UNIT/ML
500 SYRINGE INTRAVENOUS
Status: CANCELLED | OUTPATIENT
Start: 2019-01-16

## 2019-01-16 RX ADMIN — DENOSUMAB 120 MG: 120 INJECTION SUBCUTANEOUS at 12:01

## 2019-01-16 NOTE — PLAN OF CARE
Problem: Adult Inpatient Plan of Care  Goal: Plan of Care Review  Outcome: Ongoing (interventions implemented as appropriate)  Pt tolerated tx well.  AVS given to pt Pt educated to call Dr with any issues. Pt verbalized understanding. Pt adequate for discharge.

## 2019-01-21 PROBLEM — R41.82 ALTERED MENTAL STATUS: Status: ACTIVE | Noted: 2019-01-21

## 2019-01-26 PROBLEM — C50.919 METASTATIC BREAST CANCER: Status: ACTIVE | Noted: 2019-01-26

## 2019-01-29 ENCOUNTER — TELEPHONE (OUTPATIENT)
Dept: NEUROLOGY | Facility: CLINIC | Age: 74
End: 2019-01-29
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
## 2019-01-29 NOTE — TELEPHONE ENCOUNTER
----- Message from Vi Madison sent at 1/29/2019 12:00 PM CST -----  Contact: Isabela Sweet  Type: Needs Medical Advice    Who Called:  TIM Samano  Best Call Back Number [phone]  Additional Information: Have a question about the patient

## 2019-01-30 PROBLEM — T45.2X1A VITAMIN D TOXICITY: Status: ACTIVE | Noted: 2019-01-30

## 2019-02-01 ENCOUNTER — TELEPHONE (OUTPATIENT)
Dept: NEUROLOGY | Facility: CLINIC | Age: 74
End: 2019-02-01

## 2019-02-01 NOTE — TELEPHONE ENCOUNTER
----- Message from Sayda Krishna sent at 2/1/2019 12:32 PM CST -----  Contact: Ronit RAMIREZ   Type:  Sooner Apoointment Request    Caller is requesting a sooner appointment.  Caller declined first available appointment listed below.  Caller will not accept being placed on the waitlist and is requesting a message be sent to doctor.    Name of Caller:  Ronit RAMIREZ   When is the first available appointment?  4/1/19  Symptoms: [facility] f/u - mental status   Best Call Back Number: Sami Koroma at   Additional Information: Calling to schedule a Hospital f/u for memory disorder. Patient was seen by Dr Hopper first and last seen by Dr Hardy. No avail appt. Please advise.

## 2019-02-02 ENCOUNTER — PATIENT MESSAGE (OUTPATIENT)
Dept: NEUROLOGY | Facility: CLINIC | Age: 74
End: 2019-02-02

## 2019-02-04 NOTE — TELEPHONE ENCOUNTER
Spoke with pt son and notified him of multiphase test with some results not available; reminded him of f/u appt with Dr. Hopper on 02.15.19 and notified that they can discuss all results at f/u visit; verbalized understanding.

## 2019-02-08 ENCOUNTER — LAB VISIT (OUTPATIENT)
Dept: LAB | Facility: HOSPITAL | Age: 74
End: 2019-02-08
Attending: INTERNAL MEDICINE
Payer: MEDICARE

## 2019-02-08 DIAGNOSIS — C50.919 MALIGNANT NEOPLASM OF FEMALE BREAST, UNSPECIFIED ESTROGEN RECEPTOR STATUS, UNSPECIFIED LATERALITY, UNSPECIFIED SITE OF BREAST: ICD-10-CM

## 2019-02-08 DIAGNOSIS — G89.3 CANCER ASSOCIATED PAIN: ICD-10-CM

## 2019-02-08 DIAGNOSIS — F44.89 REACTIVE CONFUSION: Primary | ICD-10-CM

## 2019-02-08 LAB
ALBUMIN SERPL BCP-MCNC: 4.2 G/DL
ALP SERPL-CCNC: 74 U/L
ALT SERPL W/O P-5'-P-CCNC: 18 U/L
ANION GAP SERPL CALC-SCNC: 6 MMOL/L
AST SERPL-CCNC: 24 U/L
BASOPHILS # BLD AUTO: 0.04 K/UL
BASOPHILS NFR BLD: 0.4 %
BILIRUB SERPL-MCNC: 0.3 MG/DL
BUN SERPL-MCNC: 34 MG/DL
CALCIUM SERPL-MCNC: 9.4 MG/DL
CALCIUM SERPL-MCNC: 9.4 MG/DL
CANCER AG15-3 SERPL-ACNC: 13.3 U/ML
CEA SERPL-MCNC: 3.4 NG/ML
CHLORIDE SERPL-SCNC: 105 MMOL/L
CO2 SERPL-SCNC: 29 MMOL/L
CREAT SERPL-MCNC: 1.93 MG/DL
DIFFERENTIAL METHOD: ABNORMAL
EOSINOPHIL # BLD AUTO: 0.6 K/UL
EOSINOPHIL NFR BLD: 6.2 %
ERYTHROCYTE [DISTWIDTH] IN BLOOD BY AUTOMATED COUNT: 16.9 %
EST. GFR  (AFRICAN AMERICAN): 29 ML/MIN/1.73 M^2
EST. GFR  (NON AFRICAN AMERICAN): 25 ML/MIN/1.73 M^2
GLUCOSE SERPL-MCNC: 89 MG/DL
HCT VFR BLD AUTO: 30.8 %
HGB BLD-MCNC: 9.4 G/DL
LYMPHOCYTES # BLD AUTO: 1.6 K/UL
LYMPHOCYTES NFR BLD: 16.9 %
MCH RBC QN AUTO: 27.3 PG
MCHC RBC AUTO-ENTMCNC: 30.5 G/DL
MCV RBC AUTO: 90 FL
MONOCYTES # BLD AUTO: 0.9 K/UL
MONOCYTES NFR BLD: 9.2 %
NEUTROPHILS # BLD AUTO: 6.5 K/UL
NEUTROPHILS NFR BLD: 67.3 %
NRBC BLD-RTO: 0 /100 WBC
PLATELET # BLD AUTO: 379 K/UL
PMV BLD AUTO: 10.3 FL
POTASSIUM SERPL-SCNC: 4.6 MMOL/L
PROT SERPL-MCNC: 7.5 G/DL
PTH-INTACT SERPL-MCNC: 136 PG/ML
RBC # BLD AUTO: 3.44 M/UL
SODIUM SERPL-SCNC: 140 MMOL/L
WBC # BLD AUTO: 9.59 K/UL

## 2019-02-08 PROCEDURE — 80053 COMPREHEN METABOLIC PANEL: CPT

## 2019-02-08 PROCEDURE — 80053 COMPREHEN METABOLIC PANEL: CPT | Mod: PN

## 2019-02-08 PROCEDURE — 82378 CARCINOEMBRYONIC ANTIGEN: CPT | Mod: PN

## 2019-02-08 PROCEDURE — 86753 PROTOZOA ANTIBODY NOS: CPT

## 2019-02-08 PROCEDURE — 82310 ASSAY OF CALCIUM: CPT

## 2019-02-08 PROCEDURE — 85025 COMPLETE CBC W/AUTO DIFF WBC: CPT | Mod: PN

## 2019-02-08 PROCEDURE — 83970 ASSAY OF PARATHORMONE: CPT | Mod: PN

## 2019-02-08 PROCEDURE — 86300 IMMUNOASSAY TUMOR CA 15-3: CPT | Mod: PN

## 2019-02-08 PROCEDURE — 86300 IMMUNOASSAY TUMOR CA 15-3: CPT

## 2019-02-08 PROCEDURE — 82310 ASSAY OF CALCIUM: CPT | Mod: PN,59

## 2019-02-08 PROCEDURE — 85025 COMPLETE CBC W/AUTO DIFF WBC: CPT

## 2019-02-08 PROCEDURE — 82139 AMINO ACIDS QUAN 6 OR MORE: CPT

## 2019-02-08 PROCEDURE — 82378 CARCINOEMBRYONIC ANTIGEN: CPT

## 2019-02-08 PROCEDURE — 30000890 MISCELLANEOUS SENDOUT TEST

## 2019-02-08 PROCEDURE — 83970 ASSAY OF PARATHORMONE: CPT

## 2019-02-11 LAB
A PHAGOCYTOPH IGG TITR SER IF: NORMAL TITER
ARSENIC BLD-MCNC: <1 NG/ML (ref 0–12)
CADMIUM BLD-MCNC: 0.7 NG/ML (ref 0–4.9)
CANCER AG27-29 SERPL-ACNC: 16 U/ML
CITY: NORMAL
COUNTY: NORMAL
E CHAFFEENSIS IGG TITR SER IF: NORMAL TITER
GUARDIAN FIRST NAME: NORMAL
GUARDIAN LAST NAME: NORMAL
HOME PHONE: NORMAL
LEAD BLDV-MCNC: <1 MCG/DL (ref 0–4.9)
MERCURY BLD-MCNC: <1 NG/ML (ref 0–9)
RACE: NORMAL
STATE: NORMAL
STREET ADDRESS: NORMAL
VENOUS/CAPILLARY: NORMAL
ZIP: NORMAL

## 2019-02-13 ENCOUNTER — INFUSION (OUTPATIENT)
Dept: INFUSION THERAPY | Facility: HOSPITAL | Age: 74
End: 2019-02-13
Attending: INTERNAL MEDICINE
Payer: MEDICARE

## 2019-02-13 DIAGNOSIS — C50.912 MALIGNANT NEOPLASM OF LEFT FEMALE BREAST, UNSPECIFIED ESTROGEN RECEPTOR STATUS, UNSPECIFIED SITE OF BREAST: Primary | ICD-10-CM

## 2019-02-13 PROCEDURE — 63600175 PHARM REV CODE 636 W HCPCS: Mod: JG,PN | Performed by: NURSE PRACTITIONER

## 2019-02-13 PROCEDURE — 25000003 PHARM REV CODE 250: Mod: PN | Performed by: INTERNAL MEDICINE

## 2019-02-13 PROCEDURE — A4216 STERILE WATER/SALINE, 10 ML: HCPCS | Mod: PN | Performed by: INTERNAL MEDICINE

## 2019-02-13 PROCEDURE — 96372 THER/PROPH/DIAG INJ SC/IM: CPT | Mod: PN

## 2019-02-13 RX ORDER — SODIUM CHLORIDE 0.9 % (FLUSH) 0.9 %
10 SYRINGE (ML) INJECTION
Status: CANCELLED | OUTPATIENT
Start: 2019-02-13

## 2019-02-13 RX ORDER — HEPARIN 100 UNIT/ML
500 SYRINGE INTRAVENOUS
Status: CANCELLED
Start: 2019-02-13

## 2019-02-13 RX ORDER — HEPARIN 100 UNIT/ML
500 SYRINGE INTRAVENOUS
Status: CANCELLED | OUTPATIENT
Start: 2019-02-13

## 2019-02-13 RX ORDER — SODIUM CHLORIDE 0.9 % (FLUSH) 0.9 %
10 SYRINGE (ML) INJECTION
Status: CANCELLED
Start: 2019-02-13

## 2019-02-13 RX ORDER — SODIUM CHLORIDE 0.9 % (FLUSH) 0.9 %
10 SYRINGE (ML) INJECTION
Status: DISCONTINUED | OUTPATIENT
Start: 2019-02-13 | End: 2019-02-13 | Stop reason: HOSPADM

## 2019-02-13 RX ADMIN — DENOSUMAB 120 MG: 120 INJECTION SUBCUTANEOUS at 11:02

## 2019-02-14 LAB
AMINO ACID SCREEN: NORMAL
MISCELLANEOUS TEST NAME: NORMAL
REFERENCE LAB: NORMAL
SPECIMEN TYPE: NORMAL
TEST RESULT: NORMAL

## 2019-02-26 ENCOUNTER — TELEPHONE (OUTPATIENT)
Dept: NEUROLOGY | Facility: CLINIC | Age: 74
End: 2019-02-26

## 2019-03-13 ENCOUNTER — INFUSION (OUTPATIENT)
Dept: INFUSION THERAPY | Facility: HOSPITAL | Age: 74
End: 2019-03-13
Attending: INTERNAL MEDICINE
Payer: MEDICARE

## 2019-03-13 VITALS
SYSTOLIC BLOOD PRESSURE: 120 MMHG | HEIGHT: 64 IN | BODY MASS INDEX: 25.27 KG/M2 | HEART RATE: 73 BPM | TEMPERATURE: 98 F | WEIGHT: 148 LBS | RESPIRATION RATE: 18 BRPM | DIASTOLIC BLOOD PRESSURE: 43 MMHG

## 2019-03-13 DIAGNOSIS — C50.912 MALIGNANT NEOPLASM OF LEFT FEMALE BREAST, UNSPECIFIED ESTROGEN RECEPTOR STATUS, UNSPECIFIED SITE OF BREAST: Primary | ICD-10-CM

## 2019-03-13 PROCEDURE — 25000003 PHARM REV CODE 250: Mod: PN | Performed by: NURSE PRACTITIONER

## 2019-03-13 PROCEDURE — 96372 THER/PROPH/DIAG INJ SC/IM: CPT | Mod: PN,59

## 2019-03-13 PROCEDURE — 63600175 PHARM REV CODE 636 W HCPCS: Mod: JG,PN | Performed by: NURSE PRACTITIONER

## 2019-03-13 PROCEDURE — 96360 HYDRATION IV INFUSION INIT: CPT | Mod: PN

## 2019-03-13 PROCEDURE — 96361 HYDRATE IV INFUSION ADD-ON: CPT | Mod: PN

## 2019-03-13 RX ORDER — SODIUM CHLORIDE 0.9 % (FLUSH) 0.9 %
10 SYRINGE (ML) INJECTION
Status: CANCELLED
Start: 2019-03-13

## 2019-03-13 RX ORDER — HEPARIN 100 UNIT/ML
500 SYRINGE INTRAVENOUS
Status: CANCELLED | OUTPATIENT
Start: 2019-03-13

## 2019-03-13 RX ORDER — SODIUM CHLORIDE 0.9 % (FLUSH) 0.9 %
10 SYRINGE (ML) INJECTION
Status: CANCELLED | OUTPATIENT
Start: 2019-03-13

## 2019-03-13 RX ORDER — HEPARIN 100 UNIT/ML
500 SYRINGE INTRAVENOUS
Status: CANCELLED
Start: 2019-03-13

## 2019-03-13 RX ADMIN — DENOSUMAB 120 MG: 120 INJECTION SUBCUTANEOUS at 12:03

## 2019-03-13 RX ADMIN — SODIUM CHLORIDE 1000 ML: 9 INJECTION, SOLUTION INTRAVENOUS at 10:03

## 2019-03-13 NOTE — PLAN OF CARE
Problem: Adult Inpatient Plan of Care  Goal: Plan of Care Review  Outcome: Ongoing (interventions implemented as appropriate)  Tolerated Xgeva and hydration well, low BP see MAR, pt instructed to monitor BP at home and follow up with MD about current BP prescriptions, pt and family member verbalized understanding, schedule reviewed with pt, left infusion center per wheelchair with family

## 2019-04-10 ENCOUNTER — INFUSION (OUTPATIENT)
Dept: INFUSION THERAPY | Facility: HOSPITAL | Age: 74
End: 2019-04-10
Attending: INTERNAL MEDICINE
Payer: MEDICARE

## 2019-04-10 VITALS
DIASTOLIC BLOOD PRESSURE: 77 MMHG | SYSTOLIC BLOOD PRESSURE: 183 MMHG | OXYGEN SATURATION: 97 % | TEMPERATURE: 98 F | RESPIRATION RATE: 18 BRPM | HEART RATE: 60 BPM

## 2019-04-10 DIAGNOSIS — C50.912 MALIGNANT NEOPLASM OF LEFT FEMALE BREAST, UNSPECIFIED ESTROGEN RECEPTOR STATUS, UNSPECIFIED SITE OF BREAST: Primary | ICD-10-CM

## 2019-04-10 PROCEDURE — 96372 THER/PROPH/DIAG INJ SC/IM: CPT | Mod: PN

## 2019-04-10 PROCEDURE — 63600175 PHARM REV CODE 636 W HCPCS: Mod: JG,PN | Performed by: NURSE PRACTITIONER

## 2019-04-10 RX ORDER — SODIUM CHLORIDE 0.9 % (FLUSH) 0.9 %
10 SYRINGE (ML) INJECTION
Status: CANCELLED | OUTPATIENT
Start: 2019-05-08

## 2019-04-10 RX ORDER — HEPARIN 100 UNIT/ML
500 SYRINGE INTRAVENOUS
Status: CANCELLED
Start: 2019-05-08

## 2019-04-10 RX ORDER — SODIUM CHLORIDE 0.9 % (FLUSH) 0.9 %
10 SYRINGE (ML) INJECTION
Status: CANCELLED
Start: 2019-05-08

## 2019-04-10 RX ORDER — DONEPEZIL HYDROCHLORIDE 5 MG/1
TABLET, FILM COATED ORAL DAILY
COMMUNITY
End: 2019-08-09 | Stop reason: SDUPTHER

## 2019-04-10 RX ORDER — HEPARIN 100 UNIT/ML
500 SYRINGE INTRAVENOUS
Status: CANCELLED | OUTPATIENT
Start: 2019-05-08

## 2019-04-10 RX ADMIN — DENOSUMAB 120 MG: 120 INJECTION SUBCUTANEOUS at 12:04

## 2019-05-08 ENCOUNTER — INFUSION (OUTPATIENT)
Dept: INFUSION THERAPY | Facility: HOSPITAL | Age: 74
End: 2019-05-08
Attending: INTERNAL MEDICINE
Payer: MEDICARE

## 2019-05-08 DIAGNOSIS — C50.912 MALIGNANT NEOPLASM OF LEFT FEMALE BREAST, UNSPECIFIED ESTROGEN RECEPTOR STATUS, UNSPECIFIED SITE OF BREAST: Primary | ICD-10-CM

## 2019-05-08 PROCEDURE — 25000003 PHARM REV CODE 250: Mod: PN | Performed by: NURSE PRACTITIONER

## 2019-05-08 PROCEDURE — A4216 STERILE WATER/SALINE, 10 ML: HCPCS | Mod: PN | Performed by: NURSE PRACTITIONER

## 2019-05-08 PROCEDURE — 96372 THER/PROPH/DIAG INJ SC/IM: CPT | Mod: PN

## 2019-05-08 PROCEDURE — 63600175 PHARM REV CODE 636 W HCPCS: Mod: JG,PN | Performed by: NURSE PRACTITIONER

## 2019-05-08 PROCEDURE — 96523 IRRIG DRUG DELIVERY DEVICE: CPT | Mod: PN

## 2019-05-08 RX ORDER — SODIUM CHLORIDE 0.9 % (FLUSH) 0.9 %
10 SYRINGE (ML) INJECTION
Status: DISCONTINUED | OUTPATIENT
Start: 2019-05-08 | End: 2019-05-08 | Stop reason: HOSPADM

## 2019-05-08 RX ORDER — HEPARIN 100 UNIT/ML
500 SYRINGE INTRAVENOUS
Status: CANCELLED | OUTPATIENT
Start: 2019-06-05

## 2019-05-08 RX ORDER — HEPARIN 100 UNIT/ML
500 SYRINGE INTRAVENOUS
Status: CANCELLED
Start: 2019-06-05

## 2019-05-08 RX ORDER — SODIUM CHLORIDE 0.9 % (FLUSH) 0.9 %
10 SYRINGE (ML) INJECTION
Status: CANCELLED | OUTPATIENT
Start: 2019-06-05

## 2019-05-08 RX ORDER — SODIUM CHLORIDE 0.9 % (FLUSH) 0.9 %
10 SYRINGE (ML) INJECTION
Status: CANCELLED
Start: 2019-06-05

## 2019-05-08 RX ADMIN — Medication 10 ML: at 11:05

## 2019-05-08 RX ADMIN — DENOSUMAB 120 MG: 120 INJECTION SUBCUTANEOUS at 11:05

## 2019-05-31 ENCOUNTER — INFUSION (OUTPATIENT)
Dept: INFUSION THERAPY | Facility: HOSPITAL | Age: 74
End: 2019-05-31
Attending: INTERNAL MEDICINE
Payer: MEDICARE

## 2019-05-31 VITALS
DIASTOLIC BLOOD PRESSURE: 55 MMHG | HEART RATE: 53 BPM | TEMPERATURE: 98 F | RESPIRATION RATE: 17 BRPM | SYSTOLIC BLOOD PRESSURE: 109 MMHG

## 2019-05-31 DIAGNOSIS — C50.912 MALIGNANT NEOPLASM OF LEFT FEMALE BREAST, UNSPECIFIED ESTROGEN RECEPTOR STATUS, UNSPECIFIED SITE OF BREAST: ICD-10-CM

## 2019-05-31 DIAGNOSIS — C50.919 METASTATIC BREAST CANCER: Primary | ICD-10-CM

## 2019-05-31 PROCEDURE — A4216 STERILE WATER/SALINE, 10 ML: HCPCS | Mod: PN | Performed by: NURSE PRACTITIONER

## 2019-05-31 PROCEDURE — 25000003 PHARM REV CODE 250: Mod: PN | Performed by: NURSE PRACTITIONER

## 2019-05-31 PROCEDURE — 96361 HYDRATE IV INFUSION ADD-ON: CPT | Mod: PN

## 2019-05-31 PROCEDURE — 96360 HYDRATION IV INFUSION INIT: CPT | Mod: PN

## 2019-05-31 PROCEDURE — 63600175 PHARM REV CODE 636 W HCPCS: Mod: JG,PN | Performed by: NURSE PRACTITIONER

## 2019-05-31 PROCEDURE — 96365 THER/PROPH/DIAG IV INF INIT: CPT | Mod: PN

## 2019-05-31 RX ORDER — HEPARIN 100 UNIT/ML
500 SYRINGE INTRAVENOUS
Status: CANCELLED | OUTPATIENT
Start: 2019-06-03

## 2019-05-31 RX ORDER — SODIUM CHLORIDE 0.9 % (FLUSH) 0.9 %
10 SYRINGE (ML) INJECTION
Status: DISCONTINUED | OUTPATIENT
Start: 2019-05-31 | End: 2019-05-31 | Stop reason: HOSPADM

## 2019-05-31 RX ORDER — SODIUM CHLORIDE 0.9 % (FLUSH) 0.9 %
10 SYRINGE (ML) INJECTION
Status: CANCELLED | OUTPATIENT
Start: 2019-06-03

## 2019-05-31 RX ADMIN — SODIUM CHLORIDE, PRESERVATIVE FREE 10 ML: 5 INJECTION INTRAVENOUS at 03:05

## 2019-05-31 RX ADMIN — SODIUM CHLORIDE: 9 INJECTION, SOLUTION INTRAVENOUS at 04:05

## 2019-05-31 RX ADMIN — SODIUM CHLORIDE, PRESERVATIVE FREE 10 ML: 5 INJECTION INTRAVENOUS at 05:05

## 2019-05-31 RX ADMIN — SODIUM CHLORIDE 500 ML: 9 INJECTION, SOLUTION INTRAVENOUS at 04:05

## 2019-05-31 RX ADMIN — FERUMOXYTOL 510 MG: 510 INJECTION INTRAVENOUS at 04:05

## 2019-05-31 NOTE — PLAN OF CARE
Problem: Adult Inpatient Plan of Care  Goal: Plan of Care Review  Outcome: Ongoing (interventions implemented as appropriate)  Pt. Completed treatment, tolerated without noted distress.Vtial signs stable. Patient discharged from infusion center via WC accompanied by family. Patient had all present questions answered.

## 2019-05-31 NOTE — PATIENT INSTRUCTIONS
Oncology: Controlling Diarrhea  A common side effect of cancer and cancer treatment is having loose, watery stools that you have more often than usual (diarrhea). There are many things that can cause diarrhea, such as:  · Chemotherapy  · Radiation therapy to the belly (abdomen) or pelvis  · Antibiotics  · Other medicines you take  · Infection  · Stress  Diarrhea can cause you to quickly become dehydrated and can change the balance of nutrients and minerals in your body. To help limit this problem, try the tips on this handout.     For breakfast, try eating toasted white bread and a banana.   Tips for controlling diarrhea  These steps can help you keep diarrhea from starting or getting worse:  · Limit the amount of fiber in your diet. Avoid high-fiber foods such as whole-grain bread and brown rice. Instead, eat white bread and rice.  · Eat foods rich in potassium, such as bananas and oranges. This can help replace electrolytes lost due to diarrhea.  · Eat small, frequent meals.  · Drink plenty of fluids. Clear liquids, sports drinks, and flat light sodas such as ginger ale are best. They can help replace fluids lost due to diarrhea, and prevent dehydration.  · Avoid coffee, tea, and alcohol.  · Try not to eat foods that are fried, greasy, spicy, or sweet.  · Limit milk products and the amount of milk you drink.  Medicines can help  Ongoing diarrhea is not something you have to live with and it can become dangerous. Talk with your healthcare provider about your risk for diarrhea, what you can do to try to prevent it, and what you should do if it starts. You may be given medicine to stop diarrhea or help keep it from starting. Your healthcare provider may also suggest using an ointment to soothe irritation. Keeping the anal area clean with a mild soap or baby wipes helps as well. Dont take any over-the-counter product without asking your provider first.  When to see your healthcare provider  See your healthcare  provider if any of the following occur:  · The diarrhea lasts more than 24 to 48 hours.  · There is blood in your stool or when you wipe.  · You have pain in your belly.  · You become lightheaded or dizzy.  · Your urine becomes very dark or you stop urinating.  · The medicine you were given to stop diarrhea is not working.   Date Last Reviewed: 12/1/2016  © 4029-1771 [employer]. [address] [place] [address], [place] [address]. All rights reserved. This information is not intended as a substitute for professional medical care. Always follow your healthcare professional's instructions.

## 2019-06-07 ENCOUNTER — INFUSION (OUTPATIENT)
Dept: INFUSION THERAPY | Facility: HOSPITAL | Age: 74
End: 2019-06-07
Attending: INTERNAL MEDICINE
Payer: MEDICARE

## 2019-06-07 VITALS
TEMPERATURE: 98 F | BODY MASS INDEX: 23.71 KG/M2 | HEIGHT: 64 IN | RESPIRATION RATE: 16 BRPM | WEIGHT: 138.88 LBS | DIASTOLIC BLOOD PRESSURE: 66 MMHG | SYSTOLIC BLOOD PRESSURE: 149 MMHG | HEART RATE: 66 BPM

## 2019-06-07 DIAGNOSIS — C50.912 MALIGNANT NEOPLASM OF LEFT FEMALE BREAST, UNSPECIFIED ESTROGEN RECEPTOR STATUS, UNSPECIFIED SITE OF BREAST: ICD-10-CM

## 2019-06-07 DIAGNOSIS — C50.919 METASTATIC BREAST CANCER: Primary | ICD-10-CM

## 2019-06-07 PROCEDURE — 63600175 PHARM REV CODE 636 W HCPCS: Mod: JG,PN | Performed by: INTERNAL MEDICINE

## 2019-06-07 PROCEDURE — 63600175 PHARM REV CODE 636 W HCPCS: Mod: JG,PN | Performed by: NURSE PRACTITIONER

## 2019-06-07 PROCEDURE — 25000003 PHARM REV CODE 250: Mod: PN | Performed by: NURSE PRACTITIONER

## 2019-06-07 PROCEDURE — 96365 THER/PROPH/DIAG IV INF INIT: CPT | Mod: PN

## 2019-06-07 PROCEDURE — 96372 THER/PROPH/DIAG INJ SC/IM: CPT | Mod: PN

## 2019-06-07 PROCEDURE — A4216 STERILE WATER/SALINE, 10 ML: HCPCS | Mod: PN | Performed by: NURSE PRACTITIONER

## 2019-06-07 RX ORDER — HEPARIN 100 UNIT/ML
500 SYRINGE INTRAVENOUS
Status: CANCELLED | OUTPATIENT
Start: 2019-06-28

## 2019-06-07 RX ORDER — SODIUM CHLORIDE 0.9 % (FLUSH) 0.9 %
10 SYRINGE (ML) INJECTION
Status: CANCELLED | OUTPATIENT
Start: 2019-06-28

## 2019-06-07 RX ORDER — SODIUM CHLORIDE 0.9 % (FLUSH) 0.9 %
10 SYRINGE (ML) INJECTION
Status: DISCONTINUED | OUTPATIENT
Start: 2019-06-07 | End: 2019-06-07 | Stop reason: HOSPADM

## 2019-06-07 RX ADMIN — FERUMOXYTOL 510 MG: 510 INJECTION INTRAVENOUS at 01:06

## 2019-06-07 RX ADMIN — DENOSUMAB 120 MG: 120 INJECTION SUBCUTANEOUS at 02:06

## 2019-06-07 RX ADMIN — Medication 10 ML: at 01:06

## 2019-06-07 RX ADMIN — SODIUM CHLORIDE: 9 INJECTION, SOLUTION INTRAVENOUS at 01:06

## 2019-06-07 RX ADMIN — Medication 10 ML: at 02:06

## 2019-06-07 NOTE — PLAN OF CARE
Problem: Adult Inpatient Plan of Care  Goal: Plan of Care Review  Outcome: Ongoing (interventions implemented as appropriate)  Adequate for discharge.   Pt tolerated Ferraheme infusion and Xgeva injection without noted distress.  Reviewed upcoming appointments.  All questions answered. Advised to call MD with any questions or concerns.   Left infusion center via wheelchair with family.

## 2019-07-09 ENCOUNTER — INFUSION (OUTPATIENT)
Dept: INFUSION THERAPY | Facility: HOSPITAL | Age: 74
End: 2019-07-09
Attending: INTERNAL MEDICINE
Payer: MEDICARE

## 2019-07-09 VITALS
WEIGHT: 133 LBS | TEMPERATURE: 98 F | DIASTOLIC BLOOD PRESSURE: 70 MMHG | HEART RATE: 61 BPM | RESPIRATION RATE: 16 BRPM | BODY MASS INDEX: 23.56 KG/M2 | SYSTOLIC BLOOD PRESSURE: 176 MMHG

## 2019-07-09 DIAGNOSIS — C50.912 MALIGNANT NEOPLASM OF LEFT FEMALE BREAST, UNSPECIFIED ESTROGEN RECEPTOR STATUS, UNSPECIFIED SITE OF BREAST: ICD-10-CM

## 2019-07-09 DIAGNOSIS — C50.919 METASTATIC BREAST CANCER: Primary | ICD-10-CM

## 2019-07-09 PROCEDURE — 63600175 PHARM REV CODE 636 W HCPCS: Mod: JG,PN | Performed by: INTERNAL MEDICINE

## 2019-07-09 PROCEDURE — 96372 THER/PROPH/DIAG INJ SC/IM: CPT | Mod: PN

## 2019-07-09 RX ORDER — HEPARIN 100 UNIT/ML
500 SYRINGE INTRAVENOUS
Status: CANCELLED | OUTPATIENT
Start: 2019-07-30

## 2019-07-09 RX ADMIN — DENOSUMAB 120 MG: 120 INJECTION SUBCUTANEOUS at 10:07

## 2019-07-09 NOTE — PLAN OF CARE
Problem: Adult Inpatient Plan of Care  Goal: Plan of Care Review  Outcome: Ongoing (interventions implemented as appropriate)  Pt tolerated tx well. . AVS given to pt Pt educated to call Dr with any issues. Pt verbalized understanding. Pt adequate for discharge.

## 2019-07-09 NOTE — PLAN OF CARE
Problem: Fall Injury Risk  Goal: Absence of Fall and Fall-Related Injury    Intervention: Identify and Manage Contributors to Fall Injury Risk  Pt educated to continue calcium and vit D supplement. Pt also educated to refrain from invasive oral procedures for 3 months. Pt verbalized understanding

## 2019-08-09 ENCOUNTER — INFUSION (OUTPATIENT)
Dept: INFUSION THERAPY | Facility: HOSPITAL | Age: 74
End: 2019-08-09
Attending: INTERNAL MEDICINE
Payer: MEDICARE

## 2019-08-09 DIAGNOSIS — C50.912 MALIGNANT NEOPLASM OF LEFT FEMALE BREAST, UNSPECIFIED ESTROGEN RECEPTOR STATUS, UNSPECIFIED SITE OF BREAST: ICD-10-CM

## 2019-08-09 DIAGNOSIS — C50.919 METASTATIC BREAST CANCER: Primary | ICD-10-CM

## 2019-08-09 PROCEDURE — 25000003 PHARM REV CODE 250: Mod: PN | Performed by: INTERNAL MEDICINE

## 2019-08-09 PROCEDURE — A4216 STERILE WATER/SALINE, 10 ML: HCPCS | Mod: PN | Performed by: INTERNAL MEDICINE

## 2019-08-09 PROCEDURE — 96372 THER/PROPH/DIAG INJ SC/IM: CPT | Mod: PN

## 2019-08-09 PROCEDURE — 63600175 PHARM REV CODE 636 W HCPCS: Mod: JG,PN | Performed by: INTERNAL MEDICINE

## 2019-08-09 PROCEDURE — 96523 IRRIG DRUG DELIVERY DEVICE: CPT | Mod: PN

## 2019-08-09 RX ORDER — HEPARIN 100 UNIT/ML
500 SYRINGE INTRAVENOUS
Status: CANCELLED | OUTPATIENT
Start: 2019-09-02

## 2019-08-09 RX ORDER — SODIUM CHLORIDE 0.9 % (FLUSH) 0.9 %
10 SYRINGE (ML) INJECTION
Status: CANCELLED | OUTPATIENT
Start: 2019-09-02

## 2019-08-09 RX ORDER — HEPARIN 100 UNIT/ML
500 SYRINGE INTRAVENOUS
Status: CANCELLED | OUTPATIENT
Start: 2019-08-09

## 2019-08-09 RX ORDER — SODIUM CHLORIDE 0.9 % (FLUSH) 0.9 %
10 SYRINGE (ML) INJECTION
Status: CANCELLED | OUTPATIENT
Start: 2019-08-09

## 2019-08-09 RX ORDER — SODIUM CHLORIDE 0.9 % (FLUSH) 0.9 %
10 SYRINGE (ML) INJECTION
Status: COMPLETED | OUTPATIENT
Start: 2019-08-09 | End: 2019-08-09

## 2019-08-09 RX ADMIN — DENOSUMAB 120 MG: 120 INJECTION SUBCUTANEOUS at 10:08

## 2019-08-09 RX ADMIN — Medication 10 ML: at 10:08

## 2019-09-11 ENCOUNTER — INFUSION (OUTPATIENT)
Dept: INFUSION THERAPY | Facility: HOSPITAL | Age: 74
End: 2019-09-11
Attending: INTERNAL MEDICINE
Payer: MEDICARE

## 2019-09-11 VITALS — RESPIRATION RATE: 14 BRPM | DIASTOLIC BLOOD PRESSURE: 59 MMHG | HEART RATE: 49 BPM | SYSTOLIC BLOOD PRESSURE: 113 MMHG

## 2019-09-11 DIAGNOSIS — C50.919 METASTATIC BREAST CANCER: Primary | ICD-10-CM

## 2019-09-11 DIAGNOSIS — C50.912 MALIGNANT NEOPLASM OF LEFT FEMALE BREAST, UNSPECIFIED ESTROGEN RECEPTOR STATUS, UNSPECIFIED SITE OF BREAST: ICD-10-CM

## 2019-09-11 PROCEDURE — 63600175 PHARM REV CODE 636 W HCPCS: Mod: JG,PN | Performed by: INTERNAL MEDICINE

## 2019-09-11 PROCEDURE — 96372 THER/PROPH/DIAG INJ SC/IM: CPT | Mod: PN

## 2019-09-11 RX ORDER — HEPARIN 100 UNIT/ML
500 SYRINGE INTRAVENOUS
Status: CANCELLED | OUTPATIENT
Start: 2019-10-02

## 2019-09-11 RX ORDER — SODIUM CHLORIDE 0.9 % (FLUSH) 0.9 %
10 SYRINGE (ML) INJECTION
Status: DISCONTINUED | OUTPATIENT
Start: 2019-09-11 | End: 2019-09-11 | Stop reason: HOSPADM

## 2019-09-11 RX ORDER — SODIUM CHLORIDE 0.9 % (FLUSH) 0.9 %
10 SYRINGE (ML) INJECTION
Status: CANCELLED | OUTPATIENT
Start: 2019-10-02

## 2019-09-11 RX ADMIN — DENOSUMAB 120 MG: 120 INJECTION SUBCUTANEOUS at 10:09

## 2019-10-09 ENCOUNTER — INFUSION (OUTPATIENT)
Dept: INFUSION THERAPY | Facility: HOSPITAL | Age: 74
End: 2019-10-09
Attending: INTERNAL MEDICINE
Payer: MEDICARE

## 2019-10-09 DIAGNOSIS — C50.919 METASTATIC BREAST CANCER: Primary | ICD-10-CM

## 2019-10-09 DIAGNOSIS — C50.912 MALIGNANT NEOPLASM OF LEFT FEMALE BREAST, UNSPECIFIED ESTROGEN RECEPTOR STATUS, UNSPECIFIED SITE OF BREAST: ICD-10-CM

## 2019-10-09 PROBLEM — J96.00 ACUTE RESPIRATORY FAILURE: Status: ACTIVE | Noted: 2019-10-09

## 2019-10-09 PROBLEM — G31.83 LEWY BODY DEMENTIA: Status: ACTIVE | Noted: 2019-10-09

## 2019-10-09 PROBLEM — J96.01 ACUTE HYPOXEMIC RESPIRATORY FAILURE: Status: ACTIVE | Noted: 2019-10-09

## 2019-10-09 PROBLEM — F02.80 LEWY BODY DEMENTIA: Status: ACTIVE | Noted: 2019-10-09

## 2019-10-09 PROBLEM — R93.89 ABNORMAL CT OF THE CHEST: Status: ACTIVE | Noted: 2019-10-09

## 2019-10-09 PROBLEM — I26.99 ACUTE PULMONARY EMBOLISM: Status: ACTIVE | Noted: 2019-10-09

## 2019-10-09 PROCEDURE — 63600175 PHARM REV CODE 636 W HCPCS: Mod: JG,PN | Performed by: INTERNAL MEDICINE

## 2019-10-09 PROCEDURE — 96372 THER/PROPH/DIAG INJ SC/IM: CPT | Mod: PN

## 2019-10-09 RX ORDER — HEPARIN 100 UNIT/ML
500 SYRINGE INTRAVENOUS
Status: CANCELLED | OUTPATIENT
Start: 2019-11-04

## 2019-10-09 RX ORDER — SODIUM CHLORIDE 0.9 % (FLUSH) 0.9 %
10 SYRINGE (ML) INJECTION
Status: CANCELLED | OUTPATIENT
Start: 2019-11-04

## 2019-10-09 RX ADMIN — DENOSUMAB 120 MG: 120 INJECTION SUBCUTANEOUS at 09:10

## 2019-10-17 ENCOUNTER — HOSPITAL ENCOUNTER (INPATIENT)
Facility: HOSPITAL | Age: 74
LOS: 1 days | Discharge: HOME | DRG: 115 | End: 2019-10-18
Attending: EMERGENCY MEDICINE | Admitting: HOSPITALIST
Payer: MEDICARE

## 2019-10-17 DIAGNOSIS — H05.232 HEMORRHAGE OF LEFT ORBIT: Primary | ICD-10-CM

## 2019-10-17 DIAGNOSIS — H05.232: ICD-10-CM

## 2019-10-17 DIAGNOSIS — R07.9 CHEST PAIN: ICD-10-CM

## 2019-10-17 DIAGNOSIS — I10 HYPERTENSION, UNSPECIFIED TYPE: ICD-10-CM

## 2019-10-17 DIAGNOSIS — H53.132 ACUTE LOSS OF VISION, LEFT: ICD-10-CM

## 2019-10-17 DIAGNOSIS — Z79.01 ANTICOAGULATED: ICD-10-CM

## 2019-10-17 PROCEDURE — 99285 EMERGENCY DEPT VISIT HI MDM: CPT | Mod: ,,, | Performed by: HOSPITALIST

## 2019-10-17 PROCEDURE — 99285 PR EMERGENCY DEPT VISIT,LEVEL V: ICD-10-PCS | Mod: ,,, | Performed by: HOSPITALIST

## 2019-10-17 PROCEDURE — 99285 EMERGENCY DEPT VISIT HI MDM: CPT | Mod: 25

## 2019-10-17 PROCEDURE — 96374 THER/PROPH/DIAG INJ IV PUSH: CPT

## 2019-10-17 PROCEDURE — 96375 TX/PRO/DX INJ NEW DRUG ADDON: CPT

## 2019-10-17 RX ORDER — LIDOCAINE HYDROCHLORIDE 10 MG/ML
INJECTION INFILTRATION; PERINEURAL
Status: DISPENSED
Start: 2019-10-17 | End: 2019-10-18

## 2019-10-18 VITALS
DIASTOLIC BLOOD PRESSURE: 68 MMHG | TEMPERATURE: 97 F | OXYGEN SATURATION: 100 % | SYSTOLIC BLOOD PRESSURE: 159 MMHG | RESPIRATION RATE: 18 BRPM | HEART RATE: 61 BPM | BODY MASS INDEX: 22.71 KG/M2 | HEIGHT: 64 IN | WEIGHT: 133 LBS

## 2019-10-18 PROBLEM — H05.232: Status: ACTIVE | Noted: 2019-10-18

## 2019-10-18 PROBLEM — H53.132 ACUTE LOSS OF VISION, LEFT: Status: ACTIVE | Noted: 2019-10-18

## 2019-10-18 PROBLEM — S30.1XXA ABDOMINAL HEMATOMA: Status: ACTIVE | Noted: 2019-10-18

## 2019-10-18 PROBLEM — G20.A1 PARKINSON DISEASE: Status: ACTIVE | Noted: 2019-10-18

## 2019-10-18 PROBLEM — J96.11 CHRONIC RESPIRATORY FAILURE WITH HYPOXIA: Status: ACTIVE | Noted: 2019-10-18

## 2019-10-18 LAB
ALBUMIN SERPL BCP-MCNC: 3.1 G/DL (ref 3.5–5.2)
ALP SERPL-CCNC: 65 U/L (ref 55–135)
ALT SERPL W/O P-5'-P-CCNC: <5 U/L (ref 10–44)
ANION GAP SERPL CALC-SCNC: 8 MMOL/L (ref 8–16)
APTT BLDCRRT: 25.1 SEC (ref 21–32)
AST SERPL-CCNC: 12 U/L (ref 10–40)
BASOPHILS # BLD AUTO: 0.04 K/UL (ref 0–0.2)
BASOPHILS NFR BLD: 0.4 % (ref 0–1.9)
BILIRUB SERPL-MCNC: 0.6 MG/DL (ref 0.1–1)
BUN SERPL-MCNC: 16 MG/DL (ref 8–23)
CALCIUM SERPL-MCNC: 9.4 MG/DL (ref 8.7–10.5)
CHLORIDE SERPL-SCNC: 98 MMOL/L (ref 95–110)
CO2 SERPL-SCNC: 31 MMOL/L (ref 23–29)
CREAT SERPL-MCNC: 1 MG/DL (ref 0.5–1.4)
DIFFERENTIAL METHOD: ABNORMAL
EOSINOPHIL # BLD AUTO: 0.3 K/UL (ref 0–0.5)
EOSINOPHIL NFR BLD: 2.7 % (ref 0–8)
ERYTHROCYTE [DISTWIDTH] IN BLOOD BY AUTOMATED COUNT: 15.5 % (ref 11.5–14.5)
EST. GFR  (AFRICAN AMERICAN): >60 ML/MIN/1.73 M^2
EST. GFR  (NON AFRICAN AMERICAN): 55.6 ML/MIN/1.73 M^2
ESTIMATED AVG GLUCOSE: 108 MG/DL (ref 68–131)
GLUCOSE SERPL-MCNC: 83 MG/DL (ref 70–110)
HBA1C MFR BLD HPLC: 5.4 % (ref 4–5.6)
HCT VFR BLD AUTO: 38.9 % (ref 37–48.5)
HGB BLD-MCNC: 11.5 G/DL (ref 12–16)
IMM GRANULOCYTES # BLD AUTO: 0.03 K/UL (ref 0–0.04)
IMM GRANULOCYTES NFR BLD AUTO: 0.3 % (ref 0–0.5)
INR PPP: 1.1 (ref 0.8–1.2)
LACTATE SERPL-SCNC: 0.6 MMOL/L (ref 0.5–2.2)
LYMPHOCYTES # BLD AUTO: 1.2 K/UL (ref 1–4.8)
LYMPHOCYTES NFR BLD: 12 % (ref 18–48)
MAGNESIUM SERPL-MCNC: 1.8 MG/DL (ref 1.6–2.6)
MCH RBC QN AUTO: 29.5 PG (ref 27–31)
MCHC RBC AUTO-ENTMCNC: 29.6 G/DL (ref 32–36)
MCV RBC AUTO: 100 FL (ref 82–98)
MONOCYTES # BLD AUTO: 0.8 K/UL (ref 0.3–1)
MONOCYTES NFR BLD: 8.2 % (ref 4–15)
NEUTROPHILS # BLD AUTO: 7.4 K/UL (ref 1.8–7.7)
NEUTROPHILS NFR BLD: 76.4 % (ref 38–73)
NRBC BLD-RTO: 0 /100 WBC
PHOSPHATE SERPL-MCNC: 4.1 MG/DL (ref 2.7–4.5)
PLATELET # BLD AUTO: 300 K/UL (ref 150–350)
PMV BLD AUTO: 11.2 FL (ref 9.2–12.9)
POTASSIUM SERPL-SCNC: 4.6 MMOL/L (ref 3.5–5.1)
PROT SERPL-MCNC: 6.5 G/DL (ref 6–8.4)
PROTHROMBIN TIME: 11.3 SEC (ref 9–12.5)
RBC # BLD AUTO: 3.9 M/UL (ref 4–5.4)
SODIUM SERPL-SCNC: 137 MMOL/L (ref 136–145)
T4 FREE SERPL-MCNC: 1.15 NG/DL (ref 0.71–1.51)
TSH SERPL DL<=0.005 MIU/L-ACNC: 0.15 UIU/ML (ref 0.4–4)
WBC # BLD AUTO: 9.66 K/UL (ref 3.9–12.7)

## 2019-10-18 PROCEDURE — 97161 PT EVAL LOW COMPLEX 20 MIN: CPT

## 2019-10-18 PROCEDURE — 80053 COMPREHEN METABOLIC PANEL: CPT

## 2019-10-18 PROCEDURE — 84443 ASSAY THYROID STIM HORMONE: CPT

## 2019-10-18 PROCEDURE — 99233 PR SUBSEQUENT HOSPITAL CARE,LEVL III: ICD-10-PCS | Mod: GC,,, | Performed by: INTERNAL MEDICINE

## 2019-10-18 PROCEDURE — 25000003 PHARM REV CODE 250: Performed by: EMERGENCY MEDICINE

## 2019-10-18 PROCEDURE — 63600175 PHARM REV CODE 636 W HCPCS

## 2019-10-18 PROCEDURE — 84100 ASSAY OF PHOSPHORUS: CPT

## 2019-10-18 PROCEDURE — 85025 COMPLETE CBC W/AUTO DIFF WBC: CPT

## 2019-10-18 PROCEDURE — 36415 COLL VENOUS BLD VENIPUNCTURE: CPT

## 2019-10-18 PROCEDURE — 99233 SBSQ HOSP IP/OBS HIGH 50: CPT | Mod: GC,,, | Performed by: INTERNAL MEDICINE

## 2019-10-18 PROCEDURE — 83735 ASSAY OF MAGNESIUM: CPT

## 2019-10-18 PROCEDURE — 85610 PROTHROMBIN TIME: CPT

## 2019-10-18 PROCEDURE — 11000001 HC ACUTE MED/SURG PRIVATE ROOM

## 2019-10-18 PROCEDURE — 97165 OT EVAL LOW COMPLEX 30 MIN: CPT

## 2019-10-18 PROCEDURE — 85730 THROMBOPLASTIN TIME PARTIAL: CPT

## 2019-10-18 PROCEDURE — 83605 ASSAY OF LACTIC ACID: CPT

## 2019-10-18 PROCEDURE — 83036 HEMOGLOBIN GLYCOSYLATED A1C: CPT

## 2019-10-18 PROCEDURE — 84439 ASSAY OF FREE THYROXINE: CPT

## 2019-10-18 PROCEDURE — 25000003 PHARM REV CODE 250: Performed by: STUDENT IN AN ORGANIZED HEALTH CARE EDUCATION/TRAINING PROGRAM

## 2019-10-18 RX ORDER — ERYTHROMYCIN 5 MG/G
OINTMENT OPHTHALMIC EVERY 8 HOURS
Status: DISCONTINUED | OUTPATIENT
Start: 2019-10-18 | End: 2019-10-18 | Stop reason: HOSPADM

## 2019-10-18 RX ORDER — PROMETHAZINE HYDROCHLORIDE 25 MG/1
25 TABLET ORAL EVERY 6 HOURS PRN
Status: DISCONTINUED | OUTPATIENT
Start: 2019-10-18 | End: 2019-10-18 | Stop reason: HOSPADM

## 2019-10-18 RX ORDER — ONDANSETRON 2 MG/ML
INJECTION INTRAMUSCULAR; INTRAVENOUS
Status: COMPLETED
Start: 2019-10-18 | End: 2019-10-18

## 2019-10-18 RX ORDER — IBUPROFEN 200 MG
600 TABLET ORAL DAILY PRN
COMMUNITY

## 2019-10-18 RX ORDER — MORPHINE SULFATE 2 MG/ML
2 INJECTION, SOLUTION INTRAMUSCULAR; INTRAVENOUS
Status: COMPLETED | OUTPATIENT
Start: 2019-10-18 | End: 2019-10-18

## 2019-10-18 RX ORDER — FAMOTIDINE 20 MG/1
20 TABLET, FILM COATED ORAL NIGHTLY
Status: DISCONTINUED | OUTPATIENT
Start: 2019-10-18 | End: 2019-10-18 | Stop reason: HOSPADM

## 2019-10-18 RX ORDER — SODIUM CHLORIDE 0.9 % (FLUSH) 0.9 %
10 SYRINGE (ML) INJECTION
Status: DISCONTINUED | OUTPATIENT
Start: 2019-10-18 | End: 2019-10-18 | Stop reason: HOSPADM

## 2019-10-18 RX ORDER — HYDRALAZINE HYDROCHLORIDE 25 MG/1
25 TABLET, FILM COATED ORAL 3 TIMES DAILY PRN
Status: DISCONTINUED | OUTPATIENT
Start: 2019-10-18 | End: 2019-10-18 | Stop reason: HOSPADM

## 2019-10-18 RX ORDER — CLONAZEPAM 1 MG/1
2 TABLET ORAL NIGHTLY
Status: DISCONTINUED | OUTPATIENT
Start: 2019-10-18 | End: 2019-10-18 | Stop reason: HOSPADM

## 2019-10-18 RX ORDER — GLUCAGON 1 MG
1 KIT INJECTION
Status: DISCONTINUED | OUTPATIENT
Start: 2019-10-18 | End: 2019-10-18 | Stop reason: HOSPADM

## 2019-10-18 RX ORDER — ONDANSETRON 2 MG/ML
4 INJECTION INTRAMUSCULAR; INTRAVENOUS
Status: COMPLETED | OUTPATIENT
Start: 2019-10-18 | End: 2019-10-18

## 2019-10-18 RX ORDER — ALPRAZOLAM 1 MG/1
1 TABLET ORAL 2 TIMES DAILY PRN
Status: ON HOLD | COMMUNITY
End: 2019-10-18 | Stop reason: HOSPADM

## 2019-10-18 RX ORDER — DONEPEZIL HYDROCHLORIDE 5 MG/1
5 TABLET, FILM COATED ORAL NIGHTLY
Status: DISCONTINUED | OUTPATIENT
Start: 2019-10-18 | End: 2019-10-18 | Stop reason: HOSPADM

## 2019-10-18 RX ORDER — LEVOTHYROXINE SODIUM 125 UG/1
125 TABLET ORAL
Status: DISCONTINUED | OUTPATIENT
Start: 2019-10-18 | End: 2019-10-18 | Stop reason: HOSPADM

## 2019-10-18 RX ORDER — IBUPROFEN 200 MG
16 TABLET ORAL
Status: DISCONTINUED | OUTPATIENT
Start: 2019-10-18 | End: 2019-10-18 | Stop reason: HOSPADM

## 2019-10-18 RX ORDER — IBUPROFEN 200 MG
24 TABLET ORAL
Status: DISCONTINUED | OUTPATIENT
Start: 2019-10-18 | End: 2019-10-18 | Stop reason: HOSPADM

## 2019-10-18 RX ORDER — NEBIVOLOL 10 MG/1
10 TABLET ORAL NIGHTLY
Status: DISCONTINUED | OUTPATIENT
Start: 2019-10-19 | End: 2019-10-18 | Stop reason: HOSPADM

## 2019-10-18 RX ORDER — LISINOPRIL 20 MG/1
40 TABLET ORAL DAILY
Status: DISCONTINUED | OUTPATIENT
Start: 2019-10-18 | End: 2019-10-18 | Stop reason: HOSPADM

## 2019-10-18 RX ORDER — ALPRAZOLAM 0.5 MG/1
0.5 TABLET ORAL ONCE
Status: DISCONTINUED | OUTPATIENT
Start: 2019-10-18 | End: 2019-10-18

## 2019-10-18 RX ORDER — FENTANYL 75 UG/H
1 PATCH TRANSDERMAL
Status: DISCONTINUED | OUTPATIENT
Start: 2019-10-20 | End: 2019-10-18 | Stop reason: HOSPADM

## 2019-10-18 RX ORDER — ERYTHROMYCIN 5 MG/G
OINTMENT OPHTHALMIC EVERY 8 HOURS
Qty: 3.5 G | Refills: 1 | Status: SHIPPED | OUTPATIENT
Start: 2019-10-18 | End: 2019-11-26 | Stop reason: SDUPTHER

## 2019-10-18 RX ORDER — NEBIVOLOL 2.5 MG/1
10 TABLET ORAL NIGHTLY
Status: DISCONTINUED | OUTPATIENT
Start: 2019-10-18 | End: 2019-10-18

## 2019-10-18 RX ORDER — IPRATROPIUM BROMIDE AND ALBUTEROL SULFATE 2.5; .5 MG/3ML; MG/3ML
3 SOLUTION RESPIRATORY (INHALATION) EVERY 4 HOURS PRN
Status: DISCONTINUED | OUTPATIENT
Start: 2019-10-18 | End: 2019-10-18 | Stop reason: HOSPADM

## 2019-10-18 RX ORDER — NEBIVOLOL 2.5 MG/1
10 TABLET ORAL ONCE
Status: COMPLETED | OUTPATIENT
Start: 2019-10-18 | End: 2019-10-18

## 2019-10-18 RX ORDER — MORPHINE SULFATE 2 MG/ML
INJECTION, SOLUTION INTRAMUSCULAR; INTRAVENOUS
Status: COMPLETED
Start: 2019-10-18 | End: 2019-10-18

## 2019-10-18 RX ORDER — ACETAMINOPHEN 325 MG/1
650 TABLET ORAL EVERY 4 HOURS PRN
Status: DISCONTINUED | OUTPATIENT
Start: 2019-10-18 | End: 2019-10-18 | Stop reason: HOSPADM

## 2019-10-18 RX ORDER — ONDANSETRON 2 MG/ML
4 INJECTION INTRAMUSCULAR; INTRAVENOUS EVERY 8 HOURS PRN
Status: DISCONTINUED | OUTPATIENT
Start: 2019-10-18 | End: 2019-10-18 | Stop reason: HOSPADM

## 2019-10-18 RX ORDER — FENTANYL 75 UG/H
1 PATCH TRANSDERMAL
Status: DISCONTINUED | OUTPATIENT
Start: 2019-10-18 | End: 2019-10-18

## 2019-10-18 RX ORDER — ALPRAZOLAM 1 MG/1
1 TABLET ORAL 2 TIMES DAILY
COMMUNITY
End: 2019-11-05

## 2019-10-18 RX ADMIN — LEVOTHYROXINE SODIUM 125 MCG: 125 TABLET ORAL at 04:10

## 2019-10-18 RX ADMIN — ALPRAZOLAM 0.5 MG: 0.5 TABLET ORAL at 08:10

## 2019-10-18 RX ADMIN — LISINOPRIL 40 MG: 20 TABLET ORAL at 08:10

## 2019-10-18 RX ADMIN — MORPHINE SULFATE 2 MG: 2 INJECTION, SOLUTION INTRAMUSCULAR; INTRAVENOUS at 12:10

## 2019-10-18 RX ADMIN — ERYTHROMYCIN 1 INCH: 5 OINTMENT OPHTHALMIC at 02:10

## 2019-10-18 RX ADMIN — ONDANSETRON 4 MG: 2 INJECTION INTRAMUSCULAR; INTRAVENOUS at 12:10

## 2019-10-18 RX ADMIN — CLONAZEPAM 2 MG: 1 TABLET ORAL at 04:10

## 2019-10-18 RX ADMIN — NEBIVOLOL HYDROCHLORIDE 10 MG: 2.5 TABLET ORAL at 01:10

## 2019-10-18 RX ADMIN — FENTANYL 1 PATCH: 75 PATCH, EXTENDED RELEASE TRANSDERMAL at 05:10

## 2019-10-18 RX ADMIN — ERYTHROMYCIN: 5 OINTMENT OPHTHALMIC at 02:10

## 2019-10-18 RX ADMIN — CARBIDOPA AND LEVODOPA 1 SPLIT TABLET: 25; 100 TABLET ORAL at 02:10

## 2019-10-18 RX ADMIN — DONEPEZIL HYDROCHLORIDE 5 MG: 5 TABLET, FILM COATED ORAL at 04:10

## 2019-10-18 RX ADMIN — CARBIDOPA AND LEVODOPA 1 SPLIT TABLET: 25; 100 TABLET ORAL at 08:10

## 2019-10-18 RX ADMIN — FAMOTIDINE 20 MG: 20 TABLET ORAL at 04:10

## 2019-10-18 NOTE — CONSULTS
[facility]  Hematology/Oncology  Consult Note    Patient Name: Laura Koroma  MRN: [id number]  Admission Date: 10/17/2019  Hospital Length of Stay: 0 days  Code Status: Full Code   Attending Provider: Loida Polanco MD  Consulting Provider: Charu Moss MD  Primary Care Physician: Jinny Marquez MD  Principal Problem:Hemorrhage of left orbit    Inpatient consult to Hematology  Consult performed by: Charu Moss MD  Consult ordered by: Marlen Padilla MD        Subjective:     HPI:  Laura Koroma is a 73 year old female with Stage IV infiltrating ductal carcinoma of the breast (ER positive, currently on exemestan), CAD s/p PCI, left orbital fracture s/p repair 5/2017, Lewy bodies dementia, parkinson's, and history of PE diagnosed on recent admission in 10/09/19 presents with hemorrhage of the left eye. She states that she was in her previous state of health until yesterday morning when she woke up with her left eye swollen shut. She denies eye pain but reports mild headache at the time. She is on eliquis and ASA at home. She lives with her daughter and son-in-law who made an urgent care with her PCP. At the appointment, her PCP told her that it was likely a orbital hemorrhage and that she needed to present to the ED. At the ED in [facility], imaging showed suspected encasement of the inferior rectus muscle due to possible hematoma. She was transferred [place] [facility] [place]mology evaluation. At [facility], ophthalmology was consulted and performed lateral canthotomy. On admission to hospital medicine, her anticoagulation of ASA and eliquis was held.     Of note, she was recently admitted for shortness of breath 10/09-10/12/19 where CTA showed subtle findings of acute PE noted in only 1 vessel within the posterior right lower lobe. She was discharged on eliquis and continuous home oxygen. Her daughter reports that she is on 2L oxygen continuously at home because her oxygen saturations was  usually in the 70's without it. The patient is a current smoker but does not have documented COPD.    She is being followed for Stage IV TxN1M1 infiltrating ductal carcinoma of the breast by Dr. Granados in [facility] since 2015. She is currently on exemestan.         Oncology Treatment Plan:   [No treatment plan]    Medications:  Continuous Infusions:  Scheduled Meds:   carbidopa-levodopa 12.5-50 mg  1 split tablet Oral TID    clonazePAM  2 mg Oral QHS    donepezil  5 mg Oral QHS    erythromycin   Left Eye Q8H    famotidine  20 mg Oral QHS    fentaNYL  1 patch Transdermal Q48H    levothyroxine  125 mcg Oral Before breakfast    lidocaine HCL 10 mg/ml (1%)        lisinopril  40 mg Oral Daily    [START ON 10/19/2019] nebivolol  10 mg Oral QHS     PRN Meds:acetaminophen, albuterol-ipratropium, Dextrose 10% Bolus, Dextrose 10% Bolus, glucagon (human recombinant), glucose, glucose, hydrALAZINE, ondansetron, promethazine, sodium chloride 0.9%, sodium chloride 0.9%     Review of patient's allergies indicates:   Allergen Reactions    Wellbutrin [bupropion hcl] Anxiety        Past Medical History:   Diagnosis Date    Anxiety     Breast cancer     Coronary artery disease 8/28/2015    Nonobstructive by catheter June 2015    Depression     Essential hypertension 8/28/2015    GERD (gastroesophageal reflux disease)     Heart block     Hypertension     Hypothyroidism 8/28/2015    Lung cancer     Seasonal allergies      Past Surgical History:   Procedure Laterality Date    APPENDECTOMY      ATHERECTOMY  9/20/2018    Procedure: Atherectomy;  Surgeon: Rosa Maxwell MD;  Location: [facility] CATH;  Service: Cardiology;;    BLADDER SUSPENSION  2014    BREAST SURGERY      Breast augmentation    CAROTID ENDARTERECTOMY      CORONARY ANGIOGRAPHY N/A 9/7/2018    Procedure: ANGIOGRAM, CORONARY ARTERY;  Surgeon: Rosa Maxwell MD;  Location: [facility] CATH;  Service: Cardiology;  Laterality: N/A;    CORONARY STENT PLACEMENT N/A  9/7/2018    Procedure: GAVIOTA RCA;  Surgeon: Rosa Maxwell MD;  Location: [facility] CATH;  Service: Cardiology;  Laterality: N/A;    EYE SURGERY  2010    HYSTERECTOMY      LEFT HEART CATHETERIZATION Left 9/7/2018    Procedure: Left heart cath;  Surgeon: Isi Hartley MD;  Location: [facility] CATH;  Service: Cardiology;  Laterality: Left;    SINUS SURGERY  2014    TONSILLECTOMY       Family History     Problem Relation (Age of Onset)    Cancer Father        Tobacco Use    Smoking status: Current Some Day Smoker     Packs/day: 0.50     Types: Cigarettes    Smokeless tobacco: Never Used   Substance and Sexual Activity    Alcohol use: No     Alcohol/week: 0.0 standard drinks    Drug use: No    Sexual activity: Not on file       Review of Systems   Constitutional: Negative for chills, fatigue and fever.   HENT: Negative for hearing loss, sore throat and trouble swallowing.    Eyes: Positive for visual disturbance.   Respiratory: Negative for cough and shortness of breath.    Cardiovascular: Negative for chest pain.   Gastrointestinal: Negative for abdominal pain, constipation, diarrhea, nausea and vomiting.   Genitourinary: Negative for dysuria and frequency.   Musculoskeletal: Negative for arthralgias and myalgias.   Skin: Negative for rash.   Neurological: Negative for dizziness, weakness and headaches.     Objective:     Vital Signs (Most Recent):  Temp: 97.8 °F (36.6 °C) (10/18/19 0848)  Pulse: 64 (10/18/19 0848)  Resp: 18 (10/18/19 0848)  BP: (!) 186/86 (10/18/19 0848)  SpO2: 96 % (10/18/19 0848) Vital Signs (24h Range):  Temp:  [97.7 °F (36.5 °C)-99 °F (37.2 °C)] 97.8 °F (36.6 °C)  Pulse:  [56-69] 64  Resp:  [14-21] 18  SpO2:  [90 %-100 %] 96 %  BP: (139-208)/(63-86) 186/86     Weight: 60.3 kg (133 lb)  Body mass index is 22.83 kg/m².  Body surface area is 1.65 meters squared.      Intake/Output Summary (Last 24 hours) at 10/18/2019 0943  Last data filed at 10/18/2019 0848  Gross per 24 hour   Intake 480 ml   Output  "1 ml   Net 479 ml       Physical Exam   Constitutional: She is oriented to person, place, and time. She appears well-developed and well-nourished. No distress.   HENT:   Head: Normocephalic and atraumatic.   Right Ear: External ear normal.   Left Ear: External ear normal.   Nose: Nose normal.   Eyes: EOM are normal. Right eye exhibits no discharge. Left eye exhibits no discharge. No scleral icterus.   Ecchymosis surrounding the left eye with areas of dried blood at lateral corner, EOM movement intact   Neck: Neck supple.   Cardiovascular: Normal rate, regular rhythm and normal heart sounds. Exam reveals no gallop and no friction rub.   No murmur heard.  Pulmonary/Chest: Effort normal and breath sounds normal. No stridor. No respiratory distress. She has no wheezes. She has no rales.   Abdominal: Soft. Bowel sounds are normal. She exhibits no distension. There is no tenderness. There is no rebound and no guarding.   ecchymosis on lower abdomen   Musculoskeletal: She exhibits no edema.   ecchymosis on left arm   Neurological: She is alert and oriented to person, place, and time.   Skin: Skin is warm and dry. She is not diaphoretic.   Psychiatric: She has a normal mood and affect. Her behavior is normal. Thought content normal.   Nursing note and vitals reviewed.          Significant Labs:   CBC:   Recent Labs   Lab 10/17/19  1800 10/18/19  0657   WBC 11.13 9.66   HGB 12.8 11.5*   HCT 41.9 38.9    300    and CMP:   Recent Labs   Lab 10/17/19  1800 10/18/19  0657    137   K 4.7 4.6   CL 97 98   CO2 39* 31*    83   BUN 22* 16   CREATININE 1.02 1.0   CALCIUM 9.6 9.4   PROT 7.6 6.5   ALBUMIN 4.1 3.1*   BILITOT 0.6 0.6   ALKPHOS 63 65   AST 22 12   ALT <6* <5*   ANIONGAP 5* 8   EGFRNONAA 54* 55.6*         Assessment/Plan:     Pulmonary embolism  CTA chest on 10/9 showing evidence of "subtle findings" of acute pulmonary embolism only 1 vessel within posterior RLL  Discharged on eliquis and continuous home " oxygen 2L at that time  Had only completed 1.5 weeks of treatment with eliqius before developing spontaneous left orbital hemorrhage    Recommendations:  -Very complex situation. Ms. Koroma has recent PE and is now having hemorrhagic complications of therapeutic anticoagulation. Enoxaparin was complicated by substantial cutaneous bruising and subcutaneous hematomas on the abdomen. Apixaban is complicated by current orbital hemorrhage.    She is at high risk for extension of existing VTE as she has not had much anticoagulation to this point. We discussed that anticoagulation is normally indicated; however, her hemorrhagic risk is high as well.    In an ideal setting, I would recommend infusional heparin at a low rate and gradually uptitrate to achieve a therapeutic threshold while monitoring for complications, followed by transition to either enoxaparin (preferred) with monitoring of levels or an alternative oral anticoagulant. In discussion with Ms. Koroma and her family, they would prefer not to do this.    We also discussed the role of IVC filters, though acknowledged that these are often inadequate in the prevention of further PE, as the filter itself may serve as a thrombogenic source.    Ultimately, Ms. Koroma and her family prefer discharge to home and to follow-up with her hematologist-oncologist Dr. Bernardo to discuss whether or not to restart anticoagulation. She will continue aspirin at this time. We discussed the risks, and I provided warning signs and symptoms of worsening PE for which she should go to the emergency department immediately.       Breast cancer  Followed for Stage IV TxN1M1 infiltrating ductal carcinoma of the breast (ER positive, currently on exemestane)  - continue exemestan while inpatient  - current disease status is presumed to be negative; however, she has upcoming imaging with Dr. Bernardo      Discussed with attending Dr. Harrison.    Thank you for your consult. I will follow-up with  patient. Please contact us if you have any additional questions.    Attending addendum:    I have seen and examined Ms. Koroma with Dr. Eli. I have read his documenation and agree with the above. My recommendations are italicized. After my examination of the patient, the recommendations changed from the preliminary recommendations that Dr. Eli had previously made.

## 2019-10-18 NOTE — PT/OT/SLP EVAL
"Physical Therapy Evaluation and Discharge Note    Patient Name:  Laura Koroma   MRN:  [id number]    Recommendations:     Discharge Recommendations:  home with home health   Discharge Equipment Recommendations: none   Barriers to discharge: None    Assessment:     Laura Koroma is a 73 y.o. female admitted with a medical diagnosis of Hemorrhage of left orbit. .  At this time, patient is functioning at their prior level of function and does not require further acute PT services.     Recent Surgery: * No surgery found *      Plan:     During this hospitalization, patient does not require further acute PT services.  Please re-consult if situation changes.      Subjective     Chief Complaint: fatigue  Patient/Family Comments/goals: "I like to annoy Angelika." - when asked pt's hobbies  Pain/Comfort:  · Pain Rating 1: 0/10    Patients cultural, spiritual, Taoism conflicts given the current situation: no    Living Environment:  Pt lives c brother-in-law in [facility].  PTA has 24/7 care between paid caregiver and family.  Reports no falls.  Prior to admission, patients level of function was requiring assistance for ADLs and using RW for mobility.  Equipment used at home: walker, rolling, grab bar, cane, straight, raised toilet, bath bench(transport chair).  DME owned (not currently used): none.  Upon discharge, patient will have assistance from family/caregiver.    Objective:     Communicated with RN and OT prior to session.  Patient found sitting EOB with peripheral IV, telemetry upon PT entry to room.    General Precautions: Standard, fall   Orthopedic Precautions:N/A   Braces: N/A     Exams:  · Cognitive Exam:  Patient is oriented to Person, Place, Time and Situation  · RLE ROM: WFL  · RLE Strength: WFL  · LLE ROM: WFL  · LLE Strength: WFL    Functional Mobility:  · Bed Mobility:     · Scooting: supervision  · Transfers:     · Sit to Stand:  stand by assistance with rolling walker  · Gait: 60ft c RW " CGA  · Decreased gait speed, FFP, shuffled gait pattern  · Balance: sitting (S); standing (CGA)    AM-PAC 6 CLICK MOBILITY  Total Score:19       Therapeutic Activities and Exercises:  Pt educated on: PT role/POC; safety c mobility; benefits of OOB activities; performing therex; d/c recs - v/u  -RW ordered for use whole admitted    AM-PAC 6 CLICK MOBILITY  Total Score:19     Patient left sitting EOB with all lines intact, call button in reach, RN notified and daughter present.    GOALS:   Multidisciplinary Problems     Physical Therapy Goals     Not on file          Multidisciplinary Problems (Resolved)        Problem: Physical Therapy Goal    Goal Priority Disciplines Outcome Goal Variances Interventions   Physical Therapy Goal   (Resolved)     PT, PT/OT Met                     History:     Past Medical History:   Diagnosis Date    Anxiety     Breast cancer     Coronary artery disease 8/28/2015    Nonobstructive by catheter June 2015    Depression     Essential hypertension 8/28/2015    GERD (gastroesophageal reflux disease)     Heart block     Hypertension     Hypothyroidism 8/28/2015    Lung cancer     Seasonal allergies        Past Surgical History:   Procedure Laterality Date    APPENDECTOMY      ATHERECTOMY  9/20/2018    Procedure: Atherectomy;  Surgeon: Rosa Maxwell MD;  Location: [facility] CATH;  Service: Cardiology;;    BLADDER SUSPENSION  2014    BREAST SURGERY      Breast augmentation    CAROTID ENDARTERECTOMY      CORONARY ANGIOGRAPHY N/A 9/7/2018    Procedure: ANGIOGRAM, CORONARY ARTERY;  Surgeon: Rosa Maxwell MD;  Location: [facility] CATH;  Service: Cardiology;  Laterality: N/A;    CORONARY STENT PLACEMENT N/A 9/7/2018    Procedure: GAVIOTA RCA;  Surgeon: Rosa Maxwell MD;  [facility];  Service: Cardiology;  Laterality: N/A;    EYE SURGERY  2010    HYSTERECTOMY      LEFT HEART CATHETERIZATION Left 9/7/2018    Procedure: Left heart cath;  Surgeon: Isi Hartley MD;  Location: [facility] CATH;   Service: Cardiology;  Laterality: Left;    SINUS SURGERY  2014    TONSILLECTOMY         Time Tracking:     PT Received On: 10/18/19  PT Start Time: 1000     PT Stop Time: 1015  PT Total Time (min): 15 min     Billable Minutes: Evaluation 15 min      Cory Pérez, PT  10/18/2019

## 2019-10-18 NOTE — PLAN OF CARE
Evaluation complete.  Pt at baseline functional performance and without skilled OT needs.  DC OT  Radha Tate, OT  10/18/2019

## 2019-10-18 NOTE — SUBJECTIVE & OBJECTIVE
Oncology Treatment Plan:   [No treatment plan]    Medications:  Continuous Infusions:  Scheduled Meds:   carbidopa-levodopa 12.5-50 mg  1 split tablet Oral TID    clonazePAM  2 mg Oral QHS    donepezil  5 mg Oral QHS    erythromycin   Left Eye Q8H    famotidine  20 mg Oral QHS    fentaNYL  1 patch Transdermal Q48H    levothyroxine  125 mcg Oral Before breakfast    lidocaine HCL 10 mg/ml (1%)        lisinopril  40 mg Oral Daily    [START ON 10/19/2019] nebivolol  10 mg Oral QHS     PRN Meds:acetaminophen, albuterol-ipratropium, Dextrose 10% Bolus, Dextrose 10% Bolus, glucagon (human recombinant), glucose, glucose, hydrALAZINE, ondansetron, promethazine, sodium chloride 0.9%, sodium chloride 0.9%     Review of patient's allergies indicates:   Allergen Reactions    Wellbutrin [bupropion hcl] Anxiety        Past Medical History:   Diagnosis Date    Anxiety     Breast cancer     Coronary artery disease 8/28/2015    Nonobstructive by catheter June 2015    Depression     Essential hypertension 8/28/2015    GERD (gastroesophageal reflux disease)     Heart block     Hypertension     Hypothyroidism 8/28/2015    Lung cancer     Seasonal allergies      Past Surgical History:   Procedure Laterality Date    APPENDECTOMY      ATHERECTOMY  9/20/2018    Procedure: Atherectomy;  Surgeon: Rosa Maxwell MD;  Location: [facility] CATH;  Service: Cardiology;;    BLADDER SUSPENSION  2014    BREAST SURGERY      Breast augmentation    CAROTID ENDARTERECTOMY      CORONARY ANGIOGRAPHY N/A 9/7/2018    Procedure: ANGIOGRAM, CORONARY ARTERY;  Surgeon: Rosa Maxwell MD;  Location: [facility] CATH;  Service: Cardiology;  Laterality: N/A;    CORONARY STENT PLACEMENT N/A 9/7/2018    Procedure: GAVIOTA RCA;  Surgeon: Rosa Maxwell MD;  Location: [facility] CATH;  Service: Cardiology;  Laterality: N/A;    EYE SURGERY  2010    HYSTERECTOMY      LEFT HEART CATHETERIZATION Left 9/7/2018    Procedure: Left heart cath;  Surgeon: Isi Hartley MD;   Location: [facility];  Service: Cardiology;  Laterality: Left;    SINUS SURGERY  2014    TONSILLECTOMY       Family History     Problem Relation (Age of Onset)    Cancer Father        Tobacco Use    Smoking status: Current Some Day Smoker     Packs/day: 0.50     Types: Cigarettes    Smokeless tobacco: Never Used   Substance and Sexual Activity    Alcohol use: No     Alcohol/week: 0.0 standard drinks    Drug use: No    Sexual activity: Not on file       Review of Systems   Constitutional: Negative for chills, fatigue and fever.   HENT: Negative for hearing loss, sore throat and trouble swallowing.    Eyes: Positive for visual disturbance.   Respiratory: Negative for cough and shortness of breath.    Cardiovascular: Negative for chest pain.   Gastrointestinal: Negative for abdominal pain, constipation, diarrhea, nausea and vomiting.   Genitourinary: Negative for dysuria and frequency.   Musculoskeletal: Negative for arthralgias and myalgias.   Skin: Negative for rash.   Neurological: Negative for dizziness, weakness and headaches.     Objective:     Vital Signs (Most Recent):  Temp: 97.8 °F (36.6 °C) (10/18/19 0848)  Pulse: 64 (10/18/19 0848)  Resp: 18 (10/18/19 0848)  BP: (!) 186/86 (10/18/19 0848)  SpO2: 96 % (10/18/19 0848) Vital Signs (24h Range):  Temp:  [97.7 °F (36.5 °C)-99 °F (37.2 °C)] 97.8 °F (36.6 °C)  Pulse:  [56-69] 64  Resp:  [14-21] 18  SpO2:  [90 %-100 %] 96 %  BP: (139-208)/(63-86) 186/86     Weight: 60.3 kg (133 lb)  Body mass index is 22.83 kg/m².  Body surface area is 1.65 meters squared.      Intake/Output Summary (Last 24 hours) at 10/18/2019 0943  Last data filed at 10/18/2019 0848  Gross per 24 hour   Intake 480 ml   Output 1 ml   Net 479 ml       Physical Exam   Constitutional: She is oriented to person, place, and time. She appears well-developed and well-nourished. No distress.   HENT:   Head: Normocephalic and atraumatic.   Right Ear: External ear normal.   Left Ear: External ear  normal.   Nose: Nose normal.   Eyes: EOM are normal. Right eye exhibits no discharge. Left eye exhibits no discharge. No scleral icterus.   Ecchymosis surrounding the left eye with areas of dried blood at lateral corner, EOM movement intact   Neck: Neck supple.   Cardiovascular: Normal rate, regular rhythm and normal heart sounds. Exam reveals no gallop and no friction rub.   No murmur heard.  Pulmonary/Chest: Effort normal and breath sounds normal. No stridor. No respiratory distress. She has no wheezes. She has no rales.   Abdominal: Soft. Bowel sounds are normal. She exhibits no distension. There is no tenderness. There is no rebound and no guarding.   ecchymosis on lower abdomen   Musculoskeletal: She exhibits no edema.   ecchymosis on left arm   Neurological: She is alert and oriented to person, place, and time.   Skin: Skin is warm and dry. She is not diaphoretic.   Psychiatric: She has a normal mood and affect. Her behavior is normal. Thought content normal.   Nursing note and vitals reviewed.          Significant Labs:   CBC:   Recent Labs   Lab 10/17/19  1800 10/18/19  0657   WBC 11.13 9.66   HGB 12.8 11.5*   HCT 41.9 38.9    300    and CMP:   Recent Labs   Lab 10/17/19  1800 10/18/19  0657    137   K 4.7 4.6   CL 97 98   CO2 39* 31*    83   BUN 22* 16   CREATININE 1.02 1.0   CALCIUM 9.6 9.4   PROT 7.6 6.5   ALBUMIN 4.1 3.1*   BILITOT 0.6 0.6   ALKPHOS 63 65   AST 22 12   ALT <6* <5*   ANIONGAP 5* 8   EGFRNONAA 54* 55.6*

## 2019-10-18 NOTE — ASSESSMENT & PLAN NOTE
Patient denies chest pain she is on ASA 81 mg PO OD. Will determine whether to restart anticoagulation based on patient preference

## 2019-10-18 NOTE — ASSESSMENT & PLAN NOTE
Cr on presentation is 1 GFR: 54     Plan:  Daily weight  Strict intake output  Avoid nephrotoxic medications  Adjust medications to GFR  Daily RFPs

## 2019-10-18 NOTE — SUBJECTIVE & OBJECTIVE
Past Medical History:   Diagnosis Date    Anxiety     Breast cancer     Coronary artery disease 8/28/2015    Nonobstructive by catheter June 2015    Depression     Essential hypertension 8/28/2015    GERD (gastroesophageal reflux disease)     Heart block     Hypertension     Hypothyroidism 8/28/2015    Lung cancer     Seasonal allergies        Past Surgical History:   Procedure Laterality Date    APPENDECTOMY      ATHERECTOMY  9/20/2018    Procedure: Atherectomy;  Surgeon: Rosa Maxwell MD;  Location: [facility] CATH;  Service: Cardiology;;    BLADDER SUSPENSION  2014    BREAST SURGERY      Breast augmentation    CAROTID ENDARTERECTOMY      CORONARY ANGIOGRAPHY N/A 9/7/2018    Procedure: ANGIOGRAM, CORONARY ARTERY;  Surgeon: Rosa Maxwell MD;  Location: [facility] CATH;  Service: Cardiology;  Laterality: N/A;    CORONARY STENT PLACEMENT N/A 9/7/2018    Procedure: GAVIOTA RCA;  Surgeon: Rosa Maxwell MD;  Location: [facility]ATH;  Service: Cardiology;  Laterality: N/A;    EYE SURGERY  2010    HYSTERECTOMY      LEFT HEART CATHETERIZATION Left 9/7/2018    Procedure: Left heart cath;  Surgeon: Isi Hartley MD;  Location: [facility]ATH;  Service: Cardiology;  Laterality: Left;    SINUS SURGERY  2014    TONSILLECTOMY         Review of patient's allergies indicates:   Allergen Reactions    Wellbutrin [bupropion hcl] Anxiety       Current Facility-Administered Medications on File Prior to Encounter   Medication    [COMPLETED] ALPRAZolam tablet 0.5 mg    sodium chloride 0.9% flush 10 mL     Current Outpatient Medications on File Prior to Encounter   Medication Sig    apixaban (ELIQUIS) 5 mg (74 tabs) DsPk For the first 7 days take two 5 mg tablets twice daily.  After 7 days take one 5 mg tablet twice daily.    aspirin (ECOTRIN) 81 MG EC tablet Take 81 mg by mouth once daily.    carbidopa-levodopa  mg (SINEMET)  mg per tablet Take 0.5 tablets by mouth 3 (three) times daily.    cetirizine (ZYRTEC) 10 MG  tablet Take 10 mg by mouth once daily.    clonazePAM (KLONOPIN) 2 MG Tab Take 2 mg by mouth every evening.    denosumab (XGEVA) 120 mg/1.7 mL (70 mg/mL) Soln Inject 120 mg into the skin every 30 days.    diclofenac sodium (VOLTAREN) 1 % Gel Apply TWO grams topically ONCE daily.    donepezil (ARICEPT) 5 MG tablet Take 5 mg by mouth every evening.    exemestane (AROMASIN) 25 mg tablet Take 1 tablet (25 mg total) by mouth once daily.    famotidine (PEPCID) 20 MG tablet Take 20 mg by mouth every evening.    fentaNYL (DURAGESIC) 75 mcg/hr Place 1 patch onto the skin every 48 hours.    HYDROcodone-acetaminophen (NORCO)  mg per tablet Take 1 tablet by mouth every 6 (six) hours as needed for Pain.    levothyroxine (SYNTHROID) 125 MCG tablet Take 1 tablet (125 mcg total) by mouth before breakfast.    lisinopril (PRINIVIL,ZESTRIL) 40 MG tablet Take 1 tablet (40 mg total) by mouth once daily.    megestrol (MEGACE) 400 mg/10 mL (40 mg/mL) Susp Take 1,600 mg by mouth once daily.    mirabegron (MYRBETRIQ) 25 mg Tb24 ER tablet Take 25 mg by mouth every evening.    nebivolol (BYSTOLIC) 10 MG Tab Take 1 tablet (10 mg total) by mouth every evening.    nystatin (MYCOSTATIN) powder Apply topically 4 (four) times daily. (Patient taking differently: Apply 1 g topically daily as needed (for rash). )    ondansetron (ZOFRAN-ODT) 8 MG TbDL DISSOLVE 1 TABLET UNDER TONGUE EVERY 8 HOURS AS NEEDED FOR NAUSEA (Patient taking differently: Take 8 mg by mouth every 8 (eight) hours as needed (nausea). )    pimavanserin (NUPLAZID) 34 mg Cap Take 34 mg by mouth every morning.    vortioxetine (TRINTELLIX) 10 mg Tab Take 10 mg by mouth once daily.      Family History     Problem Relation (Age of Onset)    Cancer Father        Tobacco Use    Smoking status: Current Some Day Smoker     Packs/day: 0.50     Types: Cigarettes    Smokeless tobacco: Never Used   Substance and Sexual Activity    Alcohol use: No     Alcohol/week: 0.0  standard drinks    Drug use: No    Sexual activity: Not on file     Review of Systems   Constitutional: Negative for fatigue, fever and unexpected weight change.   HENT: Negative for sore throat.    Eyes: Positive for photophobia, pain, redness and visual disturbance. Negative for discharge and itching.   Respiratory: Negative for cough, choking, shortness of breath and wheezing.    Cardiovascular: Negative for chest pain, palpitations and leg swelling.   Gastrointestinal: Positive for abdominal pain and blood in stool. Negative for constipation, diarrhea, nausea and vomiting.   Genitourinary: Negative for dysuria and hematuria.   Neurological: Negative for dizziness, seizures, syncope and light-headedness.   Psychiatric/Behavioral: Negative for confusion.     Objective:     Vital Signs (Most Recent):  Temp: 98.7 °F (37.1 °C) (10/17/19 2209)  Pulse: (!) 56 (10/18/19 0202)  Resp: 14 (10/18/19 0202)  BP: 139/63 (10/18/19 0202)  SpO2: 98 % (10/18/19 0202) Vital Signs (24h Range):  Temp:  [98.2 °F (36.8 °C)-99 °F (37.2 °C)] 98.7 °F (37.1 °C)  Pulse:  [56-69] 56  Resp:  [14-21] 14  SpO2:  [96 %-100 %] 98 %  BP: (139-208)/(63-84) 139/63     Weight: 60.3 kg (133 lb)  Body mass index is 22.83 kg/m².    Physical Exam   Constitutional:   Patient on 2 L NC   Eyes: Left eye exhibits chemosis. Left eye exhibits no discharge and no exudate. No foreign body present in the left eye. Left conjunctiva is injected. Left conjunctiva has a hemorrhage. Left eye exhibits normal extraocular motion.   Decreased left eye vision she can see light but can not count fingers   Neck: Neck supple. No JVD present.   Cardiovascular: Normal rate and regular rhythm. Exam reveals no gallop and no friction rub.   No murmur heard.  Pulmonary/Chest: She has no wheezes. She has no rales.   Abdominal: Soft. There is tenderness (Lower abdomen).   Hematoma of the lower abdomen   Musculoskeletal: She exhibits no edema.   Neurological: She is alert.   Nursing  note and vitals reviewed.          Significant Labs:   Bilirubin:   Recent Labs   Lab 10/09/19  1817 10/17/19  1800   BILITOT 0.3 0.6   BMP:   Recent Labs   Lab 10/17/19  1800         K 4.7   CL 97   CO2 39*   BUN 22*   CREATININE 1.02   CALCIUM 9.6     CBC:   Recent Labs   Lab 10/17/19  1800   WBC 11.13   HGB 12.8   HCT 41.9        CMP:   Recent Labs   Lab 10/17/19  1800      K 4.7   CL 97   CO2 39*      BUN 22*   CREATININE 1.02   CALCIUM 9.6   PROT 7.6   ALBUMIN 4.1   BILITOT 0.6   ALKPHOS 63   AST 22   ALT <6*   ANIONGAP 5*   EGFRNONAA 54*     Coagulation:   Recent Labs   Lab 10/17/19  1759   INR 1.7   APTT 38.3*       Recent Lab Results       10/17/19  1800   10/17/19  1759        Albumin 4.1       Alkaline Phosphatase 63       ALT <6       Anion Gap 5       aPTT   38.3  Comment:  PTT normal range is not established for pediatrics.     AST 22       Baso # 0.06       Basophil% 0.5       BILIRUBIN TOTAL 0.6       BUN, Bld 22       Calcium 9.6       Chloride 97       CO2 39       Creatinine 1.02       Differential Method Automated       eGFR if  >60       eGFR if non  54  Comment:  Calculation used to obtain the estimated glomerular filtration  rate (eGFR) is the CKD-EPI equation.          Eos # 0.3       Eosinophil% 2.2       Glucose 100  Comment:  The ADA recommends the following guidelines for fasting glucose:  Normal:       less than 100 mg/dL  Prediabetes:  100 mg/dL to 125 mg/dL  Diabetes:     126 mg/dL or higher         Gran # (ANC) 8.3       Gran% 74.9       Hematocrit 41.9       Hemoglobin 12.8       Immature Grans (Abs) 0.03  Comment:  Mild elevation in immature granulocytes is non specific and   can be seen in a variety of conditions including stress response,   acute inflammation, trauma and pregnancy. Correlation with other   laboratory and clinical findings is essential.         Immature Granulocytes 0.3       Coumadin Monitoring INR    1.7     Lymph # 1.5       Lymph% 13.4       MCH 29.8       MCHC 30.5       MCV 97       Mono # 1.0       Mono% 8.7       MPV 11.0       nRBC 0       Platelets 310       Potassium 4.7       PROTEIN TOTAL 7.6       PT   19.3  Comment:  PT normal range is not established for pediatrics.     RBC 4.30       RDW 15.6       Sodium 141       WBC 11.13             Significant Imaging: I have reviewed all pertinent imaging results/findings within the past 24 hours.  I have reviewed and interpreted all pertinent imaging results/findings within the past 24 hours.

## 2019-10-18 NOTE — H&P
[facility] Hospital Medicine  History & Physical    Patient Name: Laura Koroma  MRN: [id number]  Admission Date: 10/17/2019  Attending Physician: Loida Polanco MD   Primary Care Provider: Jinny Marquez MD    [facility] Medicine Team: [facility] 4 Marlen Padilla MD     Patient information was obtained from patient, relative(s), past medical records and ER records.     Subjective:     Principal Problem:Hemorrhage of left orbit    Chief Complaint:   Chief Complaint   Patient presents with    Eye Problem     Diagnosed PE 1 wk ago. Started on eliquis. Nontraumatic L eye hematoma with decreased vision. L eye 20/200.        HPI: 73 year old female patient with PE 10/9/2019 on apixaban, parkinson's, Lewy body dementia, hypertension, dyslipidemia, hypothyroidism, acid reflux, CAD s/p PCI of RCA in 9/18, followed by orbital atherectomy and stenting of the ostial LAD with overlapping stents extending into the left main, PAC's, tobacco use and metastatic breast cancer currently on daily exemestane.      Patient presents to the ED complaninig of left eye pain for 1 day     Patient states for the last 1 day before presentation she has been having progressive left eye pain 10/10 severity. Her pain is exacerbated when she moves her eye.  She denies preceeding trauma, injury or fall. She also denies preceding infection. Her eye then started to be progressively swollen with associated blurry vision so she came to the ED. The patient has a history of an orbital floor fracture and repair after a fall 3 years ago Patient denies fever, unexpected weight change, fatigue, sore throat, chest pain, SOB, Palpitations, leg swelling, N/V, change in bowel habits and urinary symptoms. She also denies dizziness, light headedness and confusion.  Patient reports abdominal pain due to enoxaparin injections that she was receiving during her last hospitalization when she was diagnosed with PE 10/9/2019. Patient also reports  spots of blood in stool due to her hemorrhoids.    Smoking History:  Patient is active smoker 1/2 pack / day for many years     Medications History:  Patient is getting Apixaban BID for recently diagnosed PE last dose was at the morning of presentation to the ED     In ED CT head 10/17/2019 showed: soft tissue attenuation along the lateral and superior margin of the mesh which measures up to 7 mm in thickness with adjacent orbital fat stranding.  This is suspected encase the inferior rectus muscle  raises concern for possible hematoma. Ophthalmology consulted patient was diagnosed as vision threatening orbital hemorrhage and underwent bedside lateral canthotomy .     Past Medical History:   Diagnosis Date    Anxiety     Breast cancer     Coronary artery disease 8/28/2015    Nonobstructive by catheter June 2015    Depression     Essential hypertension 8/28/2015    GERD (gastroesophageal reflux disease)     Heart block     Hypertension     Hypothyroidism 8/28/2015    Lung cancer     Seasonal allergies        Past Surgical History:   Procedure Laterality Date    APPENDECTOMY      ATHERECTOMY  9/20/2018    Procedure: Atherectomy;  Surgeon: Rosa Maxwell MD;  Location: [facility] CATH;  Service: Cardiology;;    BLADDER SUSPENSION  2014    BREAST SURGERY      Breast augmentation    CAROTID ENDARTERECTOMY      CORONARY ANGIOGRAPHY N/A 9/7/2018    Procedure: ANGIOGRAM, CORONARY ARTERY;  Surgeon: Rosa Maxwell MD;  Location: [facility];  Service: Cardiology;  Laterality: N/A;    CORONARY STENT PLACEMENT N/A 9/7/2018    Procedure: GAVIOTA RCA;  Surgeon: Rosa Maxwell MD;  Location: [facility]ATH;  Service: Cardiology;  Laterality: N/A;    EYE SURGERY  2010    HYSTERECTOMY      LEFT HEART CATHETERIZATION Left 9/7/2018    Procedure: Left heart cath;  Surgeon: Isi Hartley MD;  Location: [facility];  Service: Cardiology;  Laterality: Left;    SINUS SURGERY  2014    TONSILLECTOMY         Review of patient's allergies  indicates:   Allergen Reactions    Wellbutrin [bupropion hcl] Anxiety       Current Facility-Administered Medications on File Prior to Encounter   Medication    [COMPLETED] ALPRAZolam tablet 0.5 mg    sodium chloride 0.9% flush 10 mL     Current Outpatient Medications on File Prior to Encounter   Medication Sig    apixaban (ELIQUIS) 5 mg (74 tabs) DsPk For the first 7 days take two 5 mg tablets twice daily.  After 7 days take one 5 mg tablet twice daily.    aspirin (ECOTRIN) 81 MG EC tablet Take 81 mg by mouth once daily.    carbidopa-levodopa  mg (SINEMET)  mg per tablet Take 0.5 tablets by mouth 3 (three) times daily.    cetirizine (ZYRTEC) 10 MG tablet Take 10 mg by mouth once daily.    clonazePAM (KLONOPIN) 2 MG Tab Take 2 mg by mouth every evening.    denosumab (XGEVA) 120 mg/1.7 mL (70 mg/mL) Soln Inject 120 mg into the skin every 30 days.    diclofenac sodium (VOLTAREN) 1 % Gel Apply TWO grams topically ONCE daily.    donepezil (ARICEPT) 5 MG tablet Take 5 mg by mouth every evening.    exemestane (AROMASIN) 25 mg tablet Take 1 tablet (25 mg total) by mouth once daily.    famotidine (PEPCID) 20 MG tablet Take 20 mg by mouth every evening.    fentaNYL (DURAGESIC) 75 mcg/hr Place 1 patch onto the skin every 48 hours.    HYDROcodone-acetaminophen (NORCO)  mg per tablet Take 1 tablet by mouth every 6 (six) hours as needed for Pain.    levothyroxine (SYNTHROID) 125 MCG tablet Take 1 tablet (125 mcg total) by mouth before breakfast.    lisinopril (PRINIVIL,ZESTRIL) 40 MG tablet Take 1 tablet (40 mg total) by mouth once daily.    megestrol (MEGACE) 400 mg/10 mL (40 mg/mL) Susp Take 1,600 mg by mouth once daily.    mirabegron (MYRBETRIQ) 25 mg Tb24 ER tablet Take 25 mg by mouth every evening.    nebivolol (BYSTOLIC) 10 MG Tab Take 1 tablet (10 mg total) by mouth every evening.    nystatin (MYCOSTATIN) powder Apply topically 4 (four) times daily. (Patient taking differently: Apply  1 g topically daily as needed (for rash). )    ondansetron (ZOFRAN-ODT) 8 MG TbDL DISSOLVE 1 TABLET UNDER TONGUE EVERY 8 HOURS AS NEEDED FOR NAUSEA (Patient taking differently: Take 8 mg by mouth every 8 (eight) hours as needed (nausea). )    pimavanserin (NUPLAZID) 34 mg Cap Take 34 mg by mouth every morning.    vortioxetine (TRINTELLIX) 10 mg Tab Take 10 mg by mouth once daily.      Family History     Problem Relation (Age of Onset)    Cancer Father        Tobacco Use    Smoking status: Current Some Day Smoker     Packs/day: 0.50     Types: Cigarettes    Smokeless tobacco: Never Used   Substance and Sexual Activity    Alcohol use: No     Alcohol/week: 0.0 standard drinks    Drug use: No    Sexual activity: Not on file     Review of Systems   Constitutional: Negative for fatigue, fever and unexpected weight change.   HENT: Negative for sore throat.    Eyes: Positive for photophobia, pain, redness and visual disturbance. Negative for discharge and itching.   Respiratory: Negative for cough, choking, shortness of breath and wheezing.    Cardiovascular: Negative for chest pain, palpitations and leg swelling.   Gastrointestinal: Positive for abdominal pain and blood in stool. Negative for constipation, diarrhea, nausea and vomiting.   Genitourinary: Negative for dysuria and hematuria.   Neurological: Negative for dizziness, seizures, syncope and light-headedness.   Psychiatric/Behavioral: Negative for confusion.     Objective:     Vital Signs (Most Recent):  Temp: 98.7 °F (37.1 °C) (10/17/19 2209)  Pulse: (!) 56 (10/18/19 0202)  Resp: 14 (10/18/19 0202)  BP: 139/63 (10/18/19 0202)  SpO2: 98 % (10/18/19 0202) Vital Signs (24h Range):  Temp:  [98.2 °F (36.8 °C)-99 °F (37.2 °C)] 98.7 °F (37.1 °C)  Pulse:  [56-69] 56  Resp:  [14-21] 14  SpO2:  [96 %-100 %] 98 %  BP: (139-208)/(63-84) 139/63     Weight: 60.3 kg (133 lb)  Body mass index is 22.83 kg/m².    Physical Exam   Constitutional:   Patient on 2 L NC   Eyes:  Left eye exhibits chemosis. Left eye exhibits no discharge and no exudate. No foreign body present in the left eye. Left conjunctiva is injected. Left conjunctiva has a hemorrhage. Left eye exhibits normal extraocular motion.   Decreased left eye vision she can see light but can not count fingers   Neck: Neck supple. No JVD present.   Cardiovascular: Normal rate and regular rhythm. Exam reveals no gallop and no friction rub.   No murmur heard.  Pulmonary/Chest: She has no wheezes. She has no rales.   Abdominal: Soft. There is tenderness (Lower abdomen).   Hematoma of the lower abdomen   Musculoskeletal: She exhibits no edema.   Neurological: She is alert.   Nursing note and vitals reviewed.          Significant Labs:   Bilirubin:   Recent Labs   Lab 10/09/19  1817 10/17/19  1800   BILITOT 0.3 0.6   BMP:   Recent Labs   Lab 10/17/19  1800         K 4.7   CL 97   CO2 39*   BUN 22*   CREATININE 1.02   CALCIUM 9.6     CBC:   Recent Labs   Lab 10/17/19  1800   WBC 11.13   HGB 12.8   HCT 41.9        CMP:   Recent Labs   Lab 10/17/19  1800      K 4.7   CL 97   CO2 39*      BUN 22*   CREATININE 1.02   CALCIUM 9.6   PROT 7.6   ALBUMIN 4.1   BILITOT 0.6   ALKPHOS 63   AST 22   ALT <6*   ANIONGAP 5*   EGFRNONAA 54*     Coagulation:   Recent Labs   Lab 10/17/19  1759   INR 1.7   APTT 38.3*       Recent Lab Results       10/17/19  1800   10/17/19  1759        Albumin 4.1       Alkaline Phosphatase 63       ALT <6       Anion Gap 5       aPTT   38.3  Comment:  PTT normal range is not established for pediatrics.     AST 22       Baso # 0.06       Basophil% 0.5       BILIRUBIN TOTAL 0.6       BUN, Bld 22       Calcium 9.6       Chloride 97       CO2 39       Creatinine 1.02       Differential Method Automated       eGFR if  >60       eGFR if non  54  Comment:  Calculation used to obtain the estimated glomerular filtration  rate (eGFR) is the CKD-EPI equation.           Eos # 0.3       Eosinophil% 2.2       Glucose 100  Comment:  The ADA recommends the following guidelines for fasting glucose:  Normal:       less than 100 mg/dL  Prediabetes:  100 mg/dL to 125 mg/dL  Diabetes:     126 mg/dL or higher         Gran # (ANC) 8.3       Gran% 74.9       Hematocrit 41.9       Hemoglobin 12.8       Immature Grans (Abs) 0.03  Comment:  Mild elevation in immature granulocytes is non specific and   can be seen in a variety of conditions including stress response,   acute inflammation, trauma and pregnancy. Correlation with other   laboratory and clinical findings is essential.         Immature Granulocytes 0.3       Coumadin Monitoring INR   1.7     Lymph # 1.5       Lymph% 13.4       MCH 29.8       MCHC 30.5       MCV 97       Mono # 1.0       Mono% 8.7       MPV 11.0       nRBC 0       Platelets 310       Potassium 4.7       PROTEIN TOTAL 7.6       PT   19.3  Comment:  PT normal range is not established for pediatrics.     RBC 4.30       RDW 15.6       Sodium 141       WBC 11.13             Significant Imaging: I have reviewed all pertinent imaging results/findings within the past 24 hours.  I have reviewed and interpreted all pertinent imaging results/findings within the past 24 hours.    Assessment/Plan:     * Hemorrhage of left orbit  Patient presents with left eye pain, swelling and blurry vision. She is receiving apixaban for recently diagnosed PE and ASA for CAD S/P stent. Last time she received apixaban was the morning of presentation the the ED. CTH: Left eye hematoma. Ophthalmology consulted impression is: vision threatening orbital hemorrhage and underwent bedside lateral canthotomy. Case discussed with Dr. Christina Coleman MD - Ophthalmology who is plan is: resuming/holding anticoagulation is up to internal medicine. Unable to tell if the hematoma controlled or there is still active bleeding. It was likely bleeding at one point since the patient got worse throughtout the day  however ophthalmology don't have definitive way of telling whether is still bleeding or not. There have released the pressure in her orbit and will re check her at AM. Case also discussed with Dr. Audrey Iyer - Hematology fellow who recommends to hold all kind of anticoagulation (including heparin) and aspirin for the time being until assessed by hematology at AM. We discussed the risk benefit of bleeding vs clotting with the patient and the family (Son: Karina Koroma) who both agree to hold anticoagulants for the time being and they understand the risk of clotting/thrombosis with holding treatment.     Plan:  - Hold all anticoagulants for the time being as discussed with hematology and the family.   - Follow ophthalmology recs  - NPO for any possible intervention by ophthalmology  - Hematology consult       Acute loss of vision, left  - Hemorrhage of left orbit      Parkinson disease  Resume home medications carbidopa-levodopa      Chronic respiratory failure with hypoxia  Since she was diagnosed with PE patient is on 2 L oxygen through [place]. Currently maintaining SpO2      Lewy body dementia  Resume home medication donepezil and carbidopa levodopa      Discharge planning issues  PT/OT      S/P right coronary artery (RCA) stent placement x 2  Coronary artery disease without angina pectoris      CKD (chronic kidney disease) stage 3, GFR 30-59 ml/min  Cr on presentation is 1 GFR: 54     Plan:  Daily weight  Strict intake output  Avoid nephrotoxic medications  Adjust medications to GFR  Daily RFPs      Tobacco use  Counseling about smoking cessation      Malignant neoplasm of left female breast   metastatic breast cancer currently on daily exemestane.    Could not order home medication (non-formulary)      Breast cancer        Hypothyroidism  Resume home medication Levothyroxine   TSH level      Essential hypertension  Resume home medications: Lisinopril and nebivolol  Monitor BP and adjust medications  accordingly        Coronary artery disease without angina pectoris  Patient denies chest pain she is on ASA 81 mg PO OD. Will hold for the time being until cleared by hematology this AM        VTE Risk Mitigation (From admission, onward)         Ordered     Place sequential compression device  Until discontinued      10/18/19 0328     IP VTE HIGH RISK PATIENT  Once      10/18/19 0328                   Marlen Padilla MD  Department of Hospital Medicine   [facility]

## 2019-10-18 NOTE — SUBJECTIVE & OBJECTIVE
Interval History: patient with improving vision overnight and no new complaints; will order doppler of LE to assess for clot and potentially discharge this PM      Review of Systems   Constitutional: Negative for fatigue, fever and unexpected weight change.   HENT: Negative for sore throat.    Eyes: Positive for redness and visual disturbance. Negative for photophobia, pain, discharge and itching.   Respiratory: Negative for cough, choking, shortness of breath and wheezing.    Cardiovascular: Negative for chest pain, palpitations and leg swelling.   Gastrointestinal: Positive for blood in stool. Negative for abdominal pain, constipation, diarrhea, nausea and vomiting.   Genitourinary: Negative for dysuria and hematuria.   Neurological: Negative for dizziness, seizures, syncope and light-headedness.   Psychiatric/Behavioral: Negative for confusion.     Objective:     Vital Signs (Most Recent):  Temp: 96.6 °F (35.9 °C) (10/18/19 1146)  Pulse: (!) 56 (10/18/19 1146)  Resp: 18 (10/18/19 1146)  BP: (!) 188/77 (10/18/19 1146)  SpO2: 100 % (10/18/19 1146) Vital Signs (24h Range):  Temp:  [96.6 °F (35.9 °C)-99 °F (37.2 °C)] 96.6 °F (35.9 °C)  Pulse:  [56-69] 56  Resp:  [14-21] 18  SpO2:  [90 %-100 %] 100 %  BP: (139-208)/(63-86) 188/77     Weight: 60.3 kg (133 lb)  Body mass index is 22.83 kg/m².    Physical Exam   Constitutional:   Patient on 2 L NC   Eyes: Left eye exhibits chemosis. Left eye exhibits no discharge and no exudate. No foreign body present in the left eye. Left conjunctiva is injected. Left conjunctiva has a hemorrhage. Left eye exhibits normal extraocular motion.   Decreased left eye vision but improving   Neck: Neck supple. No JVD present.   Cardiovascular: Normal rate and regular rhythm. Exam reveals no gallop and no friction rub.   No murmur heard.  Pulmonary/Chest: She has no wheezes. She has no rales.   Abdominal: Soft. She exhibits no distension. Tenderness: Lower abdomen.   Hematoma of the lower  abdomen   Musculoskeletal: She exhibits no edema.   Neurological: She is alert.   Nursing note and vitals reviewed.          Significant Labs:   Bilirubin:   Recent Labs   Lab 10/09/19  1817 10/17/19  1800 10/18/19  0657   BILITOT 0.3 0.6 0.6   BMP:   Recent Labs   Lab 10/18/19  0657   GLU 83      K 4.6   CL 98   CO2 31*   BUN 16   CREATININE 1.0   CALCIUM 9.4   MG 1.8     CBC:   Recent Labs   Lab 10/17/19  1800 10/18/19  0657   WBC 11.13 9.66   HGB 12.8 11.5*   HCT 41.9 38.9    300     CMP:   Recent Labs   Lab 10/17/19  1800 10/18/19  0657    137   K 4.7 4.6   CL 97 98   CO2 39* 31*    83   BUN 22* 16   CREATININE 1.02 1.0   CALCIUM 9.6 9.4   PROT 7.6 6.5   ALBUMIN 4.1 3.1*   BILITOT 0.6 0.6   ALKPHOS 63 65   AST 22 12   ALT <6* <5*   ANIONGAP 5* 8   EGFRNONAA 54* 55.6*     Coagulation:   Recent Labs   Lab 10/18/19  0657   INR 1.1   APTT 25.1       Recent Lab Results       10/18/19  0657   10/17/19  1800   10/17/19  1759        Albumin 3.1 4.1       Alkaline Phosphatase 65 63       ALT <5 <6       Anion Gap 8 5       aPTT 25.1  Comment:  aPTT therapeutic range = 39-69 seconds   38.3  Comment:  PTT normal range is not established for pediatrics.     AST 12 22       Baso # 0.04 0.06       Basophil% 0.4 0.5       BILIRUBIN TOTAL 0.6  Comment:  For infants and newborns, interpretation of results should be based  on gestational age, weight and in agreement with clinical  observations.  Premature Infant recommended reference ranges:  Up to 24 hours.............<8.0 mg/dL  Up to 48 hours............<12.0 mg/dL  3-5 days..................<15.0 mg/dL  6-29 days.................<15.0 mg/dL   0.6       BUN, Bld 16 22       Calcium 9.4 9.6       Chloride 98 97       CO2 31 39       Creatinine 1.0 1.02       Differential Method Automated Automated       eGFR if  >60.0 >60       eGFR if non  55.6  Comment:  Calculation used to obtain the estimated glomerular  filtration  rate (eGFR) is the CKD-EPI equation.    54  Comment:  Calculation used to obtain the estimated glomerular filtration  rate (eGFR) is the CKD-EPI equation.          Eos # 0.3 0.3       Eosinophil% 2.7 2.2       Estimated Avg Glucose 108         Free T4 1.15         Glucose 83 100  Comment:  The ADA recommends the following guidelines for fasting glucose:  Normal:       less than 100 mg/dL  Prediabetes:  100 mg/dL to 125 mg/dL  Diabetes:     126 mg/dL or higher         Gran # (ANC) 7.4 8.3       Gran% 76.4 74.9       Hematocrit 38.9 41.9       Hemoglobin 11.5 12.8       Hemoglobin A1C External 5.4  Comment:  ADA Screening Guidelines:  5.7-6.4%  Consistent with prediabetes  >or=6.5%  Consistent with diabetes  High levels of fetal hemoglobin interfere with the HbA1C  assay. Heterozygous hemoglobin variants (HbS, HgC, etc)do  not significantly interfere with this assay.   However, presence of multiple variants may affect accuracy.           Immature Grans (Abs) 0.03  Comment:  Mild elevation in immature granulocytes is non specific and   can be seen in a variety of conditions including stress response,   acute inflammation, trauma and pregnancy. Correlation with other   laboratory and clinical findings is essential.   0.03  Comment:  Mild elevation in immature granulocytes is non specific and   can be seen in a variety of conditions including stress response,   acute inflammation, trauma and pregnancy. Correlation with other   laboratory and clinical findings is essential.         Immature Granulocytes 0.3 0.3       Coumadin Monitoring INR 1.1  Comment:  Coumadin Therapy:  2.0 - 3.0 for INR for all indicators except mechanical heart valves  and antiphospholipid syndromes which should use 2.5 - 3.5.     1.7     Lactate, Panda 0.6  Comment:  Falsely low lactic acid results can be found in samples   containing >=13.0 mg/dL total bilirubin and/or >=3.5 mg/dL   direct bilirubin.           Lymph # 1.2 1.5        Lymph% 12.0 13.4       Magnesium 1.8         MCH 29.5 29.8       MCHC 29.6 30.5        97       Mono # 0.8 1.0       Mono% 8.2 8.7       MPV 11.2 11.0       nRBC 0 0       Phosphorus 4.1         Platelets 300 310       Potassium 4.6 4.7       PROTEIN TOTAL 6.5 7.6       Protime 11.3         PT     19.3  Comment:  PT normal range is not established for pediatrics.     RBC 3.90 4.30       RDW 15.5 15.6       Sodium 137 141       TSH 0.148         WBC 9.66 11.13             Significant Imaging: I have reviewed all pertinent imaging results/findings within the past 24 hours.  I have reviewed and interpreted all pertinent imaging results/findings within the past 24 hours.

## 2019-10-18 NOTE — PHARMACY MED REC
"Admission Medication Reconciliation - Pharmacy Consult Note    The home medication history was taken by Stacey Crespo.  Based on information gathered and subsequent review by the clinical pharmacist, the items below may need attention.     You may go to "Admission" then "Reconcile Home Medications" tabs to review and/or act upon these items.     Potentially problematic discrepancies with current MAR  o Patient IS taking the following which was not ordered upon admit  o Mirabegron 25mg every evening  o Pimavanserin 34mg every morning. Non-formulary medication, would need to bring from home  o Vortioxetine 10mg qdaily. Non-formulary medication, would need to bring from home  o Exemestane. OK to continue inpatient per heme/onc. Non-formulary medication, would need to bring from home  o Patient IS NOT taking the following which was ordered upon admit  o Donepezil         Please address this information as you see fit.  Feel free to contact us if you have any questions or require assistance.    Idris Hazel, PharmD  [id number]                .    .            "

## 2019-10-18 NOTE — HPI
Pt feeling better today. Eye pain is reduced, just feels tired now. States that vision is much improved and almost at baseline.

## 2019-10-18 NOTE — ASSESSMENT & PLAN NOTE
metastatic breast cancer currently on daily exemestane.    Could not order home medication (non-formulary)

## 2019-10-18 NOTE — HOSPITAL COURSE
10/18-patient with improving vision overnight and no new complaints; will order doppler of LE to assess for clot and potentially discharge this PM pending opthalmology assessment

## 2019-10-18 NOTE — ASSESSMENT & PLAN NOTE
At the sites of enoxaparin injections. CT abdomen showed: nodular areas of intermediate to high attenuation soft tissue in the anterior midline to left paramidline lower abdominal wall subcutaneous adipose tissue with surrounding fat stranding.  This could be related to contusion/hematoma.  There is overlying skin thickening      Plan:  Will monitor for worsening/extension vs resolution  Hold anticoagulants and ASA  Daily CBC

## 2019-10-18 NOTE — HPI
Laura Koroma is a 73 year old female with Stage IV infiltrating ductal carcinoma of the breast (ER positive, currently on exemestan), CAD s/p PCI, left orbital fracture s/p repair 5/2017, Lewy bodies dementia, parkinson's, and history PE diagnosed on recent admission in 10/09 presents with hemorrhage of the left eye. She states that she was in her previous state of health until yesterday morning when she woke up with her left eye swollen shut. She denies eye pain but reports mild headache at the time. She lives with her daughter and son-in-law who made an urgent care with her PCP. At the appointment, her PCP told her that he thought that is was likely a orbital hemorrhage and that she needed to present to the ED. They presented to the ED in [facility] where imaging showed suspected encasement of the inferior rectus muscle. She was transferred to the [facility] for ophthalmology evaluation. At [facility], ophthalmology was consulted and performed lateral canthotomy. On admission to hospital medicine, her anticoagulation of ASA and eliquis was held.     Of note, she was recently admitted for shortness of breath 10/09-10/12/19 where CTA showed that she had subtle findings of acute PE noted in only 1 vessel within the posterior Right Lower lobe. She was discharged on eliquis and continuous home oxygen. Her daughter reports that she is on 2L oxygen continuously at home because her oxygen saturations was usually in the 70's without it. The patient is a current smoker but does not have documented COPD.    She is being followed for Stage IV [id number] infiltrating ductal carcinoma of the breast by Dr. Granados in [facility]. She is currently on exemestan.

## 2019-10-18 NOTE — CONSULTS
Consultation Report  Ophthalmology Service    Date: 10/18/2019    Chief complaint/Reason for Consult: left eye hematoma     History of Present Illness: Laura Koroma is a 73 y.o. female with PMHx of PE on Eliquis, metastatic brain cancer, parkinson's and Lewy body dementia who presents as a transfer from [facility] with concern for orbital hematoma. The patient reports that she woke up this morning with bruising and swelling to the periorbital area of the left eye. She also noted blurry vision which continued to worsen. When home health care arrived to her house they recommended that she go to the ED. She started taking Eliquis 1 week ago for a new diagnosis of a PE. She denies trauma or fall during this episode. The patient has a history of an orbital floor fracture and repair after a fall 3 years ago. The patient reports blurry vision, deep aching pain, and pain when she moves her eye.     POcularHx: Hx of orbital floor fracture 3 years ago left eye. CEIOL OU. Hx of macular hole 10 years ago, unknown eye. Wears reading glasses    Current eye gtts: none      PMHx:  has a past medical history of Anxiety, Breast cancer, Coronary artery disease (8/28/2015), Depression, Essential hypertension (8/28/2015), GERD (gastroesophageal reflux disease), Heart block, Hypertension, Hypothyroidism (8/28/2015), Lung cancer, and Seasonal allergies.     PSurgHx:  has a past surgical history that includes Eye surgery (2010); Sinus surgery (2014); Bladder suspension (2014); Carotid endarterectomy; Appendectomy; Tonsillectomy; Breast surgery; Hysterectomy; Left heart catheterization (Left, 9/7/2018); Coronary angiography (N/A, 9/7/2018); Coronary stent placement (N/A, 9/7/2018); and Atherectomy (9/20/2018).     Home Medications:   Prior to Admission medications    Medication Sig Start Date End Date Taking? Authorizing Provider   apixaban (ELIQUIS) 5 mg (74 tabs) DsPk For the first 7 days take two 5 mg tablets twice daily.  After 7 days take  one 5 mg tablet twice daily. 10/11/19   Jeison Smith MD   aspirin (ECOTRIN) 81 MG EC tablet Take 81 mg by mouth once daily.    Historical Provider, MD   carbidopa-levodopa  mg (SINEMET)  mg per tablet Take 0.5 tablets by mouth 3 (three) times daily.    Historical Provider, MD   cetirizine (ZYRTEC) 10 MG tablet Take 10 mg by mouth once daily.    Historical Provider, MD   clonazePAM (KLONOPIN) 2 MG Tab Take 2 mg by mouth every evening.    Historical Provider, MD   denosumab (XGEVA) 120 mg/1.7 mL (70 mg/mL) Soln Inject 120 mg into the skin every 30 days.    Historical Provider, MD   diclofenac sodium (VOLTAREN) 1 % Gel Apply TWO grams topically ONCE daily. 9/26/19   MADAY Crump   donepezil (ARICEPT) 5 MG tablet Take 5 mg by mouth every evening.    Historical Provider, MD   exemestane (AROMASIN) 25 mg tablet Take 1 tablet (25 mg total) by mouth once daily. 8/22/18   Prabhjot Bernardo MD   famotidine (PEPCID) 20 MG tablet Take 20 mg by mouth every evening.    Historical Provider, MD   fentaNYL (DURAGESIC) 75 mcg/hr Place 1 patch onto the skin every 48 hours. 10/9/19   MAI Domínguez   HYDROcodone-acetaminophen (NORCO)  mg per tablet Take 1 tablet by mouth every 6 (six) hours as needed for Pain.    Historical Provider, MD   levothyroxine (SYNTHROID) 125 MCG tablet Take 1 tablet (125 mcg total) by mouth before breakfast. 12/10/18 12/10/19  Thomas Noe MD   lisinopril (PRINIVIL,ZESTRIL) 40 MG tablet Take 1 tablet (40 mg total) by mouth once daily. 2/2/19 2/2/20  Jeison Smith MD   megestrol (MEGACE) 400 mg/10 mL (40 mg/mL) Susp Take 1,600 mg by mouth once daily.    Historical Provider, MD   mirabegron (MYRBETRIQ) 25 mg Tb24 ER tablet Take 25 mg by mouth every evening.    Historical Provider, MD   nebivolol (BYSTOLIC) 10 MG Tab Take 1 tablet (10 mg total) by mouth every evening. 2/1/19 2/1/20  Jeison Smith MD   nystatin (MYCOSTATIN) powder Apply topically 4 (four) times  daily.  Patient taking differently: Apply 1 g topically daily as needed (for rash).  3/29/19   Prabhjot Bernardo MD   ondansetron (ZOFRAN-ODT) 8 MG TbDL DISSOLVE 1 TABLET UNDER TONGUE EVERY 8 HOURS AS NEEDED FOR NAUSEA  Patient taking differently: Take 8 mg by mouth every 8 (eight) hours as needed (nausea).  2/25/19   Prabhjot Bernardo MD   pimavanserin (NUPLAZID) 34 mg Cap Take 34 mg by mouth every morning.    Historical Provider, MD   vortioxetine (TRINTELLIX) 10 mg Tab Take 10 mg by mouth once daily.     Historical Provider, MD        Medications this encounter:     Allergies: is allergic to wellbutrin [bupropion hcl].     Social:  reports that she has been smoking cigarettes. She has been smoking about 0.50 packs per day. She has never used smokeless tobacco. She reports that she does not drink alcohol or use drugs.     Family Hx: No family history of glaucoma, macular degeneration, or blindness. family history includes Cancer in her father.     ROS: Negative x 10 except for complaints as described in HPI; negative for fever, chills, weight loss, nausea, vomiting, diarrhea, shortness of breath, nasal discharge, cough, abdominal pain, dyspnea, difficulty moving arms and legs, confusion, dysuria, palpitations, or chest pain     Ocular examination/Dilated fundus examination:  Base Eye Exam     Visual Acuity (Snellen - Linear)       Right Left    Dist sc 20/50 20/100    Dist ph sc NI NI   Difficult 2/2 patient cooperation           Tonometry (Tonopen, 11:42 PM)       Right Left    Pressure 16 33          Pupils       Dark Light Shape React APD    Right 4 2 Round Brisk None    Left 4 2 Round Brisk None          Extraocular Movement       Right Left     0 0 0   0  0   0 0 0    -- -3 --   -2  -2   -- -2 --               Slit Lamp and Fundus Exam     External Exam       Right Left    External Normal periorbital edema and ecchymosis, Proptosis          Slit Lamp Exam       Right Left    Lids/Lashes Normal periorbital edema and  ecchymosis    Conjunctiva/Sclera White and quiet 2+ Injection    Cornea Clear Clear    Anterior Chamber Deep and formed Deep and formed    Iris Round and pharm dilated Round and pharm dilated    Lens Posterior chamber intraocular lens Posterior chamber intraocular lens    Vitreous Normal Normal          Fundus Exam       Right Left    Disc pink, sharp pink, sharp    C/D Ratio 0.4 0.4    Macula flat, good FLR flat, good FLR    Vessels Normal Normal    Periphery degeneration inferotemporal. No holes, tears, detachments 360 Normal. No holes, tears, detachments 360                Assessment/Plan:   1. Orbital hemorrhage, left eye  - VA 20/100, IOP 33, Extraocular movements impaired and painful  - s/p lateral canthotomy at bedside  - Recommend admission to medicine for observation as patient likely cannot be taken off of Eliquis and this orbital hemorrhage was vision threatening.   - Start erythromycin ointment to incision and in the left eye TID  - We will see patient tomorrow to check IOP and extraocular movements for improvement.  - Patient will also be scheduled in clinic with Dr. Martinez within 2 weeks.     Discussed patient's history, physical, assessment and plan with Dr. Castellanos.  If there are further questions, please page the on call ophthalmology resident.    Christina Coleman MD  PGY2, Ophthalmology Resident  10/18/2019  11:37 PM       Procedure Note:   Procedure: Lateral canthotomy, left eye  Diagnosis: orbital hemorrhage  R/B/A were discussed with patient and her son who is the POA and both agreed to the procedure after all questions were answered. The left lateral canthus was injected with lidocaine with epinephrine 2%.  A hemostat was clamped at the lateral canthus and held for 2 minutes.  The hemostat was removed and iris scissors were used to cut the lateral canthus from the angle of the eye to the orbital rim. The inferior lid was pulled down and scissors were used to dissect down to the lateral canthal  tendon.  Canthal tendon was cut along with inferior lid. Low temp cautery was used to stop bleeding at the lateral canthus. The patient tolerated the procedure well with no complications. Pressure by tonopen was 18.

## 2019-10-18 NOTE — PT/OT/SLP EVAL
Occupational Therapy   Evaluation and Discharge Note    Name: Laura Koroma  MRN: [id number]  Admitting Diagnosis:  Hemorrhage of left orbit      Recommendations:     Discharge Recommendations: home with home health  Discharge Equipment Recommendations:  none  Barriers to discharge:       Assessment:     Laura Koroma is a 73 y.o. female with a medical diagnosis of Hemorrhage of left orbit. At this time, patient is functioning at their prior level of function and does not require further acute OT services.     Plan:     During this hospitalization, patient does not require further acute OT services.  Please re-consult if situation changes.    · Plan of Care Reviewed with: patient, daughter    Subjective     Chief Complaint: Pt desires to leave hospital   Patient/Family Comments/goals: return to home     Occupational Profile:  Living Environment: Pt lives with her brother in law in a 1 story house with no steps to enter   Previous level of function: Pt with 24 hours care at baseline for safe self care and home performance   Equipment Used at home:  wheelchair, walker, rolling, cane, straight, raised toilet, bath bench  Assistance upon Discharge: family able to assist     Pain/Comfort:  · Pain Rating 1: 0/10    Patients cultural, spiritual, Hoahaoism conflicts given the current situation: no    Objective:     Communicated with: RN prior to session.  Patient found supine with peripheral IV, telemetry upon OT entry to room.    General Precautions: Standard, fall   Orthopedic Precautions:N/A   Braces: N/A     Occupational Performance:    Bed Mobility:    · Patient completed Rolling/Turning to Right with contact guard assistance  · Patient completed Scooting/Bridging with contact guard assistance  · Patient completed Supine to Sit with contact guard assistance    Functional Mobility/Transfers:  · Patient completed Sit <> Stand Transfer with contact guard assistance  with  hand-held assist     Activities of Daily  Living:  · Upper Body Dressing: minimum assistance    · Lower Body Dressing: minimum assistance    · Toileting: minimum assistance      Cognitive/Visual Perceptual:  Cognitive/Psychosocial Skills:     -       Oriented to: Person, Place and Time   -       Follows Commands/attention:Follows one-step commands  -       Communication: clear/fluent  -       Memory: Impaired LTM  -       Safety awareness/insight to disability: impaired   -       Mood/Affect/Coping skills/emotional control: Appropriate to situation    Physical Exam:  Upper Extremity Range of Motion:     -       Right Upper Extremity: WFL  -       Left Upper Extremity: WFL  Upper Extremity Strength:    -       Right Upper Extremity: WFL  -       Left Upper Extremity: WFL    AMPAC 6 Click ADL:  AMPAC Total Score: 18    Treatment & Education:  Evaluation complete.  Pt educated on safety, role of OT, importance of increased participation in self care for gains , expectations for participation, expectations for gains, POC, energy conservation, caregiver strain. White board updated.     Education:    Patient left supine with all lines intact    GOALS:   Multidisciplinary Problems     Occupational Therapy Goals     Not on file          Multidisciplinary Problems (Resolved)        Problem: Occupational Therapy Goal    Goal Priority Disciplines Outcome Interventions   Occupational Therapy Goal   (Resolved)     OT, PT/OT Met                    History:     Past Medical History:   Diagnosis Date    Anxiety     Breast cancer     Coronary artery disease 8/28/2015    Nonobstructive by catheter June 2015    Depression     Essential hypertension 8/28/2015    GERD (gastroesophageal reflux disease)     Heart block     Hypertension     Hypothyroidism 8/28/2015    Lung cancer     Seasonal allergies        Past Surgical History:   Procedure Laterality Date    APPENDECTOMY      ATHERECTOMY  9/20/2018    Procedure: Atherectomy;  Surgeon: Rosa Maxwell MD;  Location:  [facility] C[facility];  Service: Cardiology;;    BLADDER SUSPENSION  2014    BREAST SURGERY      Breast augmentation    CAROTID ENDARTERECTOMY      CORONARY ANGIOGRAPHY N/A 9/7/2018    Procedure: ANGIOGRAM, CORONARY ARTERY;  Surgeon: Rosa Maxwell MD;  Location: [facility];  Service: Cardiology;  Laterality: N/A;    CORONARY STENT PLACEMENT N/A 9/7/2018    Procedure: GAVIOTA RCA;  Surgeon: Rosa Maxwell MD;  Location: [facility];  Service: Cardiology;  Laterality: N/A;    EYE SURGERY  2010    HYSTERECTOMY      LEFT HEART CATHETERIZATION Left 9/7/2018    Procedure: Left heart cath;  Surgeon: Isi Hartley MD;  [facility];  Service: Cardiology;  Laterality: Left;    SINUS SURGERY  2014    TONSILLECTOMY         Time Tracking:     OT Date of Treatment: 10/18/19  OT Start Time: 1015  OT Stop Time: 1030  OT Total Time (min): 15 min    Billable Minutes:Evaluation 15    Radha Tate, OT  10/18/2019

## 2019-10-18 NOTE — H&P
[facility] Hospital Medicine  History & Physical    Patient Name: Laura Koroma  MRN: [id number]  Admission Date: 10/17/2019  Attending Physician: Loida Polanco MD   Primary Care Provider: Jinny Marquez MD    [facility] Medicine Team: [facility] 4 Marlen Padilla MD     Patient information was obtained from patient, relative(s), past medical records and ER records.     Subjective:     Principal Problem:Hemorrhage of left orbit    Chief Complaint:   Chief Complaint   Patient presents with    Eye Problem     Diagnosed PE 1 wk ago. Started on eliquis. Nontraumatic L eye hematoma with decreased vision. L eye 20/200.        HPI: 73 year old female patient with PE 10/9/2019 on apixaban, parkinson's, Lewy body dementia, hypertension, dyslipidemia, hypothyroidism, acid reflux, CAD s/p PCI of RCA in 9/18, followed by orbital atherectomy and stenting of the ostial LAD with overlapping stents extending into the left main, PAC's, tobacco use and metastatic breast cancer currently on daily exemestane.      Patient presents to the ED complaninig of left eye pain for 1 day     Patient states for the last 1 day before presentation she has been having progressive left eye pain 10/10 severity. Her pain is exacerbated when she moves her eye.  She denies preceeding trauma, injury or fall. She also denies preceding infection. Her eye then started to be progressively swollen with associated blurry vision so she came to the ED. The patient has a history of an orbital floor fracture and repair after a fall 3 years ago Patient denies fever, unexpected weight change, fatigue, sore throat, chest pain, SOB, Palpitations, leg swelling, N/V, change in bowel habits and urinary symptoms. She also denies dizziness, light headedness and confusion.  Patient reports abdominal pain due to enoxaparin injections that she was receiving during her last hospitalization when she was diagnosed with PE 10/9/2019. Patient also reports  spots of blood in stool due to her hemorrhoids.    Smoking History:  Patient is active smoker 1/2 pack / day for many years     Medications History:  Patient is getting Apixaban BID for recently diagnosed PE last dose was at the morning of presentation to the ED     In ED CT head 10/17/2019 showed: soft tissue attenuation along the lateral and superior margin of the mesh which measures up to 7 mm in thickness with adjacent orbital fat stranding.  This is suspected encase the inferior rectus muscle  raises concern for possible hematoma. Ophthalmology consulted patient was diagnosed as vision threatening orbital hemorrhage and underwent bedside lateral canthotomy .     Past Medical History:   Diagnosis Date    Anxiety     Breast cancer     Coronary artery disease 8/28/2015    Nonobstructive by catheter June 2015    Depression     Essential hypertension 8/28/2015    GERD (gastroesophageal reflux disease)     Heart block     Hypertension     Hypothyroidism 8/28/2015    Lung cancer     Seasonal allergies        Past Surgical History:   Procedure Laterality Date    APPENDECTOMY      ATHERECTOMY  9/20/2018    Procedure: Atherectomy;  Surgeon: Rosa Maxwell MD;  Location: [facility] CATH;  Service: Cardiology;;    BLADDER SUSPENSION  2014    BREAST SURGERY      Breast augmentation    CAROTID ENDARTERECTOMY      CORONARY ANGIOGRAPHY N/A 9/7/2018    Procedure: ANGIOGRAM, CORONARY ARTERY;  Surgeon: Rosa Maxwell MD;  Location: [facility] CATH;  Service: Cardiology;  Laterality: N/A;    CORONARY STENT PLACEMENT N/A 9/7/2018    Procedure: GAVIOTA RCA;  Surgeon: Rosa Maxwell MD;  Location: [facility] CATH;  Service: Cardiology;  Laterality: N/A;    EYE SURGERY  2010    HYSTERECTOMY      LEFT HEART CATHETERIZATION Left 9/7/2018    Procedure: Left heart cath;  Surgeon: Isi Hartley MD;  Location: [facility] CATH;  Service: Cardiology;  Laterality: Left;    SINUS SURGERY  2014    TONSILLECTOMY         Review of patient's allergies  indicates:   Allergen Reactions    Wellbutrin [bupropion hcl] Anxiety       Current Facility-Administered Medications on File Prior to Encounter   Medication    [COMPLETED] ALPRAZolam tablet 0.5 mg    sodium chloride 0.9% flush 10 mL     Current Outpatient Medications on File Prior to Encounter   Medication Sig    apixaban (ELIQUIS) 5 mg (74 tabs) DsPk For the first 7 days take two 5 mg tablets twice daily.  After 7 days take one 5 mg tablet twice daily.    aspirin (ECOTRIN) 81 MG EC tablet Take 81 mg by mouth once daily.    carbidopa-levodopa  mg (SINEMET)  mg per tablet Take 0.5 tablets by mouth 3 (three) times daily.    cetirizine (ZYRTEC) 10 MG tablet Take 10 mg by mouth once daily.    clonazePAM (KLONOPIN) 2 MG Tab Take 2 mg by mouth every evening.    denosumab (XGEVA) 120 mg/1.7 mL (70 mg/mL) Soln Inject 120 mg into the skin every 30 days.    diclofenac sodium (VOLTAREN) 1 % Gel Apply TWO grams topically ONCE daily.    donepezil (ARICEPT) 5 MG tablet Take 5 mg by mouth every evening.    exemestane (AROMASIN) 25 mg tablet Take 1 tablet (25 mg total) by mouth once daily.    famotidine (PEPCID) 20 MG tablet Take 20 mg by mouth every evening.    fentaNYL (DURAGESIC) 75 mcg/hr Place 1 patch onto the skin every 48 hours.    HYDROcodone-acetaminophen (NORCO)  mg per tablet Take 1 tablet by mouth every 6 (six) hours as needed for Pain.    levothyroxine (SYNTHROID) 125 MCG tablet Take 1 tablet (125 mcg total) by mouth before breakfast.    lisinopril (PRINIVIL,ZESTRIL) 40 MG tablet Take 1 tablet (40 mg total) by mouth once daily.    megestrol (MEGACE) 400 mg/10 mL (40 mg/mL) Susp Take 1,600 mg by mouth once daily.    mirabegron (MYRBETRIQ) 25 mg Tb24 ER tablet Take 25 mg by mouth every evening.    nebivolol (BYSTOLIC) 10 MG Tab Take 1 tablet (10 mg total) by mouth every evening.    nystatin (MYCOSTATIN) powder Apply topically 4 (four) times daily. (Patient taking differently: Apply  1 g topically daily as needed (for rash). )    ondansetron (ZOFRAN-ODT) 8 MG TbDL DISSOLVE 1 TABLET UNDER TONGUE EVERY 8 HOURS AS NEEDED FOR NAUSEA (Patient taking differently: Take 8 mg by mouth every 8 (eight) hours as needed (nausea). )    pimavanserin (NUPLAZID) 34 mg Cap Take 34 mg by mouth every morning.    vortioxetine (TRINTELLIX) 10 mg Tab Take 10 mg by mouth once daily.      Family History     Problem Relation (Age of Onset)    Cancer Father        Tobacco Use    Smoking status: Current Some Day Smoker     Packs/day: 0.50     Types: Cigarettes    Smokeless tobacco: Never Used   Substance and Sexual Activity    Alcohol use: No     Alcohol/week: 0.0 standard drinks    Drug use: No    Sexual activity: Not on file     Review of Systems   Constitutional: Negative for fatigue, fever and unexpected weight change.   HENT: Negative for sore throat.    Eyes: Positive for photophobia, pain, redness and visual disturbance. Negative for discharge and itching.   Respiratory: Negative for cough, choking, shortness of breath and wheezing.    Cardiovascular: Negative for chest pain, palpitations and leg swelling.   Gastrointestinal: Positive for abdominal pain and blood in stool. Negative for constipation, diarrhea, nausea and vomiting.   Genitourinary: Negative for dysuria and hematuria.   Neurological: Negative for dizziness, seizures, syncope and light-headedness.   Psychiatric/Behavioral: Negative for confusion.     Objective:     Vital Signs (Most Recent):  Temp: 97.7 °F (36.5 °C) (10/18/19 0411)  Pulse: (!) 58 (10/18/19 0411)  Resp: 18 (10/18/19 0411)  BP: (!) 179/74 (10/18/19 0411)  SpO2: (!) 90 % (10/18/19 0411) Vital Signs (24h Range):  Temp:  [97.7 °F (36.5 °C)-99 °F (37.2 °C)] 97.7 °F (36.5 °C)  Pulse:  [56-69] 58  Resp:  [14-21] 18  SpO2:  [90 %-100 %] 90 %  BP: (139-208)/(63-84) 179/74     Weight: 60.3 kg (133 lb)  Body mass index is 22.83 kg/m².    Physical Exam   Constitutional:   Patient on 2 L NC    Eyes: Left eye exhibits chemosis. Left eye exhibits no discharge and no exudate. No foreign body present in the left eye. Left conjunctiva is injected. Left conjunctiva has a hemorrhage. Left eye exhibits normal extraocular motion.   Decreased left eye vision she can see light but can not count fingers   Neck: Neck supple. No JVD present.   Cardiovascular: Normal rate and regular rhythm. Exam reveals no gallop and no friction rub.   No murmur heard.  Pulmonary/Chest: She has no wheezes. She has no rales.   Abdominal: Soft. There is tenderness (Lower abdomen).   Hematoma of the lower abdomen   Musculoskeletal: She exhibits no edema.   Neurological: She is alert.   Nursing note and vitals reviewed.            Significant Labs:   Bilirubin:   Recent Labs   Lab 10/09/19  1817 10/17/19  1800   BILITOT 0.3 0.6   BMP:   Recent Labs   Lab 10/17/19  1800         K 4.7   CL 97   CO2 39*   BUN 22*   CREATININE 1.02   CALCIUM 9.6     CBC:   Recent Labs   Lab 10/17/19  1800   WBC 11.13   HGB 12.8   HCT 41.9        CMP:   Recent Labs   Lab 10/17/19  1800      K 4.7   CL 97   CO2 39*      BUN 22*   CREATININE 1.02   CALCIUM 9.6   PROT 7.6   ALBUMIN 4.1   BILITOT 0.6   ALKPHOS 63   AST 22   ALT <6*   ANIONGAP 5*   EGFRNONAA 54*     Coagulation:   Recent Labs   Lab 10/17/19  1759   INR 1.7   APTT 38.3*       Recent Lab Results       10/17/19  1800   10/17/19  1759        Albumin 4.1       Alkaline Phosphatase 63       ALT <6       Anion Gap 5       aPTT   38.3  Comment:  PTT normal range is not established for pediatrics.     AST 22       Baso # 0.06       Basophil% 0.5       BILIRUBIN TOTAL 0.6       BUN, Bld 22       Calcium 9.6       Chloride 97       CO2 39       Creatinine 1.02       Differential Method Automated       eGFR if  >60       eGFR if non  54  Comment:  Calculation used to obtain the estimated glomerular filtration  rate (eGFR) is the CKD-EPI  equation.          Eos # 0.3       Eosinophil% 2.2       Glucose 100  Comment:  The ADA recommends the following guidelines for fasting glucose:  Normal:       less than 100 mg/dL  Prediabetes:  100 mg/dL to 125 mg/dL  Diabetes:     126 mg/dL or higher         Gran # (ANC) 8.3       Gran% 74.9       Hematocrit 41.9       Hemoglobin 12.8       Immature Grans (Abs) 0.03  Comment:  Mild elevation in immature granulocytes is non specific and   can be seen in a variety of conditions including stress response,   acute inflammation, trauma and pregnancy. Correlation with other   laboratory and clinical findings is essential.         Immature Granulocytes 0.3       Coumadin Monitoring INR   1.7     Lymph # 1.5       Lymph% 13.4       MCH 29.8       MCHC 30.5       MCV 97       Mono # 1.0       Mono% 8.7       MPV 11.0       nRBC 0       Platelets 310       Potassium 4.7       PROTEIN TOTAL 7.6       PT   19.3  Comment:  PT normal range is not established for pediatrics.     RBC 4.30       RDW 15.6       Sodium 141       WBC 11.13             Significant Imaging: I have reviewed all pertinent imaging results/findings within the past 24 hours.  I have reviewed and interpreted all pertinent imaging results/findings within the past 24 hours.    Assessment/Plan:     * Hemorrhage of left orbit  Patient presents with left eye pain, swelling and blurry vision. She is receiving apixaban for recently diagnosed PE and ASA for CAD S/P stent. Last time she received apixaban was the morning of presentation the the ED. CTH: Left eye hematoma. Ophthalmology consulted impression is: vision threatening orbital hemorrhage and underwent bedside lateral canthotomy. Case discussed with Dr. Christina Coleman MD - Ophthalmology who is plan is: resuming/holding anticoagulation is up to internal medicine. Unable to tell if the hematoma controlled or there is still active bleeding. It was likely bleeding at one point since the patient got worse  throughtout the day however ophthalmology don't have definitive way of telling whether is still bleeding or not. There have released the pressure in her orbit and will re check her at AM. Case also discussed with Dr. Audrey Iyer - Hematology fellow who recommends to hold all kind of anticoagulation (including heparin) and aspirin for the time being until assessed by hematology at AM. We discussed the risk benefit of bleeding vs clotting with the patient and the family (Son: Karina Koroma) who both agree to hold anticoagulants for the time being and they understand the risk of clotting/thrombosis with holding treatment.     Plan:  - Hold all anticoagulants for the time being as discussed with hematology and the family.   - Follow ophthalmology recs  - NPO for any possible intervention by ophthalmology  - Hematology consult       Acute loss of vision, left  - Hemorrhage of left orbit      Abdominal hematoma  At the sites of enoxaparin injections. CT abdomen showed: nodular areas of intermediate to high attenuation soft tissue in the anterior midline to left paramidline lower abdominal wall subcutaneous adipose tissue with surrounding fat stranding.  This could be related to contusion/hematoma.  There is overlying skin thickening      Plan:  Will monitor for worsening/extension vs resolution  Hold anticoagulants and ASA  Daily CBC    Parkinson disease  Resume home medications carbidopa-levodopa      Chronic respiratory failure with hypoxia  Since she was diagnosed with PE patient is on 2 L oxygen through NC. Currently maintaining SpO2      Lewy body dementia  Resume home medication donepezil and carbidopa levodopa      Discharge planning issues  PT/OT      S/P right coronary artery (RCA) stent placement x 2  Coronary artery disease without angina pectoris      CKD (chronic kidney disease) stage 3, GFR 30-59 ml/min  Cr on presentation is 1 GFR: 54     Plan:  Daily weight  Strict intake output  Avoid nephrotoxic  medications  Adjust medications to GFR  Daily RFPs      Tobacco use  Counseling about smoking cessation      Malignant neoplasm of left female breast   metastatic breast cancer currently on daily exemestane.    Could not order home medication (non-formulary)      Breast cancer        Hypothyroidism  Resume home medication Levothyroxine   TSH level      Essential hypertension  Resume home medications: Lisinopril and nebivolol  Monitor BP and adjust medications accordingly        Coronary artery disease without angina pectoris  Patient denies chest pain she is on ASA 81 mg PO OD. Will hold for the time being until cleared by hematology this AM        VTE Risk Mitigation (From admission, onward)         Ordered     Place sequential compression device  Until discontinued      10/18/19 0328     IP VTE HIGH RISK PATIENT  Once      10/18/19 0328                   Marlen Padilla MD  Department of Hospital Medicine   [facility]

## 2019-10-18 NOTE — ASSESSMENT & PLAN NOTE
"CTA chest on 10/9 showing evidence of "subtle findings" of acute pulmonary embolism only 1 vessel within posterior RLL  Discharged on eliquis and continuous home oxygen 2L at that time  Had only completed 1.5 weeks of treatment with eliqius before developing spontaneous orbital hemorrhage    Recommendations:  -given patient's history of current malignancy, she will be at higher risk of clotting but PE noted to be "subtle" and only "visible in 1 vessel" in posterior RLL  -will likely need to have discussion with family about risks and benefits of discontinuing anticoagulation    "

## 2019-10-18 NOTE — PLAN OF CARE
Jinny Marquez MD       [facility] [address] [place]  [address] [facility] [address]  Phone: [phone] Fax: [phone]    [facility] [place], [place] - [address]   [address]  [facility] [address]  Phone: [phone] Fax: [phone]    Payor: MEDICARE / Plan: MEDICARE PART A & B / Product Type: Government /       10/18/19 0815   Discharge Assessment   Assessment Type Discharge Planning Assessment   Confirmed/corrected address and phone number on facesheet? Yes   Assessment information obtained from? Caregiver  (Angelika Solis-Daughter    [phone])   Expected Length of Stay (days) 2   Communicated expected length of stay with patient/caregiver yes   Prior to hospitilization cognitive status: Alert/Oriented   Prior to hospitalization functional status: Assistive Equipment   Current cognitive status: Unable to Assess  (Patient is currently sleeping.)   Current Functional Status: Needs Assistance   Lives With other relative(s)  (Brother-in-law)   Able to Return to Prior Arrangements yes   Is patient able to care for self after discharge? Yes   Who are your caregiver(s) and their phone number(s)? Angelika Solis-Daughter    [phone]   Patient's perception of discharge disposition home or selfcare   Readmission Within the Last 30 Days current reason for admission unrelated to previous admission   If yes, most recent facility name: [facility]   Patient currently being followed by outpatient case management? No   Patient currently receives any other outside agency services? Yes   Name and contact number of agency or person providing outside services [facility]   Is it the patient/care giver preference to resume care with the current outside agency? Yes   Equipment Currently Used at Home walker, rolling;wheelchair;oxygen;bedside commode   Do you have any problems affording any of your prescribed medications?  No   Is the patient taking medications as prescribed? yes   Does the patient have transportation home? Yes   Transportation Anticipated family or friend will provide   Dialysis Name and Scheduled days N/A   Does the patient receive services at the Coumadin Clinic? No   Discharge Plan A Home;Home Health   Discharge Plan B Home with family;Home Health   DME Needed Upon Discharge    (TBD)   Patient/Family in Agreement with Plan yes

## 2019-10-18 NOTE — ASSESSMENT & PLAN NOTE
Followed for Stage IV TxN1M1 infiltrating ductal carcinoma of the breast (ER positive, currently on exemestan)  -would agree to continue exemestan while inpatient (though noted to not be on formulary)

## 2019-10-18 NOTE — SUBJECTIVE & OBJECTIVE
Past Medical History:   Diagnosis Date    Anxiety     Breast cancer     Coronary artery disease 8/28/2015    Nonobstructive by catheter June 2015    Depression     Essential hypertension 8/28/2015    GERD (gastroesophageal reflux disease)     Heart block     Hypertension     Hypothyroidism 8/28/2015    Lung cancer     Seasonal allergies        Past Surgical History:   Procedure Laterality Date    APPENDECTOMY      ATHERECTOMY  9/20/2018    Procedure: Atherectomy;  Surgeon: Rosa Maxwell MD;  Location: [facility] CATH;  Service: Cardiology;;    BLADDER SUSPENSION  2014    BREAST SURGERY      Breast augmentation    CAROTID ENDARTERECTOMY      CORONARY ANGIOGRAPHY N/A 9/7/2018    Procedure: ANGIOGRAM, CORONARY ARTERY;  Surgeon: Rosa Maxwell MD;  Location: [facility] CATH;  Service: Cardiology;  Laterality: N/A;    CORONARY STENT PLACEMENT N/A 9/7/2018    Procedure: GAVIOTA RCA;  Surgeon: Rosa Maxwell MD;  Location: [facility] CATH;  Service: Cardiology;  Laterality: N/A;    EYE SURGERY  2010    HYSTERECTOMY      LEFT HEART CATHETERIZATION Left 9/7/2018    Procedure: Left heart cath;  Surgeon: Isi Hartley MD;  Location: [facility] CATH;  Service: Cardiology;  Laterality: Left;    SINUS SURGERY  2014    TONSILLECTOMY         Review of patient's allergies indicates:   Allergen Reactions    Wellbutrin [bupropion hcl] Anxiety       Current Facility-Administered Medications on File Prior to Encounter   Medication    [COMPLETED] ALPRAZolam tablet 0.5 mg    sodium chloride 0.9% flush 10 mL     Current Outpatient Medications on File Prior to Encounter   Medication Sig    apixaban (ELIQUIS) 5 mg (74 tabs) DsPk For the first 7 days take two 5 mg tablets twice daily.  After 7 days take one 5 mg tablet twice daily.    aspirin (ECOTRIN) 81 MG EC tablet Take 81 mg by mouth once daily.    carbidopa-levodopa  mg (SINEMET)  mg per tablet Take 0.5 tablets by mouth 3 (three) times daily.    cetirizine (ZYRTEC) 10 MG  tablet Take 10 mg by mouth once daily.    clonazePAM (KLONOPIN) 2 MG Tab Take 2 mg by mouth every evening.    denosumab (XGEVA) 120 mg/1.7 mL (70 mg/mL) Soln Inject 120 mg into the skin every 30 days.    diclofenac sodium (VOLTAREN) 1 % Gel Apply TWO grams topically ONCE daily.    donepezil (ARICEPT) 5 MG tablet Take 5 mg by mouth every evening.    exemestane (AROMASIN) 25 mg tablet Take 1 tablet (25 mg total) by mouth once daily.    famotidine (PEPCID) 20 MG tablet Take 20 mg by mouth every evening.    fentaNYL (DURAGESIC) 75 mcg/hr Place 1 patch onto the skin every 48 hours.    HYDROcodone-acetaminophen (NORCO)  mg per tablet Take 1 tablet by mouth every 6 (six) hours as needed for Pain.    levothyroxine (SYNTHROID) 125 MCG tablet Take 1 tablet (125 mcg total) by mouth before breakfast.    lisinopril (PRINIVIL,ZESTRIL) 40 MG tablet Take 1 tablet (40 mg total) by mouth once daily.    megestrol (MEGACE) 400 mg/10 mL (40 mg/mL) Susp Take 1,600 mg by mouth once daily.    mirabegron (MYRBETRIQ) 25 mg Tb24 ER tablet Take 25 mg by mouth every evening.    nebivolol (BYSTOLIC) 10 MG Tab Take 1 tablet (10 mg total) by mouth every evening.    nystatin (MYCOSTATIN) powder Apply topically 4 (four) times daily. (Patient taking differently: Apply 1 g topically daily as needed (for rash). )    ondansetron (ZOFRAN-ODT) 8 MG TbDL DISSOLVE 1 TABLET UNDER TONGUE EVERY 8 HOURS AS NEEDED FOR NAUSEA (Patient taking differently: Take 8 mg by mouth every 8 (eight) hours as needed (nausea). )    pimavanserin (NUPLAZID) 34 mg Cap Take 34 mg by mouth every morning.    vortioxetine (TRINTELLIX) 10 mg Tab Take 10 mg by mouth once daily.      Family History     Problem Relation (Age of Onset)    Cancer Father        Tobacco Use    Smoking status: Current Some Day Smoker     Packs/day: 0.50     Types: Cigarettes    Smokeless tobacco: Never Used   Substance and Sexual Activity    Alcohol use: No     Alcohol/week: 0.0  standard drinks    Drug use: No    Sexual activity: Not on file     Review of Systems   Constitutional: Negative for fatigue, fever and unexpected weight change.   HENT: Negative for sore throat.    Eyes: Positive for photophobia, pain, redness and visual disturbance. Negative for discharge and itching.   Respiratory: Negative for cough, choking, shortness of breath and wheezing.    Cardiovascular: Negative for chest pain, palpitations and leg swelling.   Gastrointestinal: Positive for abdominal pain and blood in stool. Negative for constipation, diarrhea, nausea and vomiting.   Genitourinary: Negative for dysuria and hematuria.   Neurological: Negative for dizziness, seizures, syncope and light-headedness.   Psychiatric/Behavioral: Negative for confusion.     Objective:     Vital Signs (Most Recent):  Temp: 97.7 °F (36.5 °C) (10/18/19 0411)  Pulse: (!) 58 (10/18/19 0411)  Resp: 18 (10/18/19 0411)  BP: (!) 179/74 (10/18/19 0411)  SpO2: (!) 90 % (10/18/19 0411) Vital Signs (24h Range):  Temp:  [97.7 °F (36.5 °C)-99 °F (37.2 °C)] 97.7 °F (36.5 °C)  Pulse:  [56-69] 58  Resp:  [14-21] 18  SpO2:  [90 %-100 %] 90 %  BP: (139-208)/(63-84) 179/74     Weight: 60.3 kg (133 lb)  Body mass index is 22.83 kg/m².    Physical Exam   Constitutional:   Patient on 2 L NC   Eyes: Left eye exhibits chemosis. Left eye exhibits no discharge and no exudate. No foreign body present in the left eye. Left conjunctiva is injected. Left conjunctiva has a hemorrhage. Left eye exhibits normal extraocular motion.   Decreased left eye vision she can see light but can not count fingers   Neck: Neck supple. No JVD present.   Cardiovascular: Normal rate and regular rhythm. Exam reveals no gallop and no friction rub.   No murmur heard.  Pulmonary/Chest: She has no wheezes. She has no rales.   Abdominal: Soft. There is tenderness (Lower abdomen).   Hematoma of the lower abdomen   Musculoskeletal: She exhibits no edema.   Neurological: She is alert.    Nursing note and vitals reviewed.            Significant Labs:   Bilirubin:   Recent Labs   Lab 10/09/19  1817 10/17/19  1800   BILITOT 0.3 0.6   BMP:   Recent Labs   Lab 10/17/19  1800         K 4.7   CL 97   CO2 39*   BUN 22*   CREATININE 1.02   CALCIUM 9.6     CBC:   Recent Labs   Lab 10/17/19  1800   WBC 11.13   HGB 12.8   HCT 41.9        CMP:   Recent Labs   Lab 10/17/19  1800      K 4.7   CL 97   CO2 39*      BUN 22*   CREATININE 1.02   CALCIUM 9.6   PROT 7.6   ALBUMIN 4.1   BILITOT 0.6   ALKPHOS 63   AST 22   ALT <6*   ANIONGAP 5*   EGFRNONAA 54*     Coagulation:   Recent Labs   Lab 10/17/19  1759   INR 1.7   APTT 38.3*       Recent Lab Results       10/17/19  1800   10/17/19  1759        Albumin 4.1       Alkaline Phosphatase 63       ALT <6       Anion Gap 5       aPTT   38.3  Comment:  PTT normal range is not established for pediatrics.     AST 22       Baso # 0.06       Basophil% 0.5       BILIRUBIN TOTAL 0.6       BUN, Bld 22       Calcium 9.6       Chloride 97       CO2 39       Creatinine 1.02       Differential Method Automated       eGFR if  >60       eGFR if non  54  Comment:  Calculation used to obtain the estimated glomerular filtration  rate (eGFR) is the CKD-EPI equation.          Eos # 0.3       Eosinophil% 2.2       Glucose 100  Comment:  The ADA recommends the following guidelines for fasting glucose:  Normal:       less than 100 mg/dL  Prediabetes:  100 mg/dL to 125 mg/dL  Diabetes:     126 mg/dL or higher         Gran # (ANC) 8.3       Gran% 74.9       Hematocrit 41.9       Hemoglobin 12.8       Immature Grans (Abs) 0.03  Comment:  Mild elevation in immature granulocytes is non specific and   can be seen in a variety of conditions including stress response,   acute inflammation, trauma and pregnancy. Correlation with other   laboratory and clinical findings is essential.         Immature Granulocytes 0.3       Coumadin  Monitoring INR   1.7     Lymph # 1.5       Lymph% 13.4       MCH 29.8       MCHC 30.5       MCV 97       Mono # 1.0       Mono% 8.7       MPV 11.0       nRBC 0       Platelets 310       Potassium 4.7       PROTEIN TOTAL 7.6       PT   19.3  Comment:  PT normal range is not established for pediatrics.     RBC 4.30       RDW 15.6       Sodium 141       WBC 11.13             Significant Imaging: I have reviewed all pertinent imaging results/findings within the past 24 hours.  I have reviewed and interpreted all pertinent imaging results/findings within the past 24 hours.

## 2019-10-18 NOTE — ED PROVIDER NOTES
Encounter Date: 10/17/2019       History     Chief Complaint   Patient presents with    Eye Problem     Diagnosed PE 1 wk ago. Started on eliquis. Nontraumatic L eye hematoma with decreased vision. L eye 20/200.     Pt is a 74 yo F with PMH of PE on Eliquis, metastatic breast cancer followed by Dr. Bernardo, parkinsonism, and Lewy body dementia who presents as a transfer from [facility] by EMS from for concern for orbital hematoma and decreased visual acuity. Pt tells me she had surgery for a orbital floor fracture about 3 years ago that was repaired. Today she woke up with bruising and swelling to her left eye. She noted blurry vision which has continued to worsen.  She denies any new injury or trauma. She was started on Eliquis last week for a new diagnosis of a PE.  Labs and head CT done at OSH prior to arrival.      The history is provided by the patient and the EMS personnel.     Review of patient's allergies indicates:   Allergen Reactions    Wellbutrin [bupropion hcl] Anxiety     Past Medical History:   Diagnosis Date    Anxiety     Breast cancer     Coronary artery disease 8/28/2015    Nonobstructive by catheter June 2015    Depression     Essential hypertension 8/28/2015    GERD (gastroesophageal reflux disease)     Heart block     Hypertension     Hypothyroidism 8/28/2015    Lung cancer     Seasonal allergies      Past Surgical History:   Procedure Laterality Date    APPENDECTOMY      ATHERECTOMY  9/20/2018    Procedure: Atherectomy;  Surgeon: Rosa Maxwell MD;  Location: [facility] CATH;  Service: Cardiology;;    BLADDER SUSPENSION  2014    BREAST SURGERY      Breast augmentation    CAROTID ENDARTERECTOMY      CORONARY ANGIOGRAPHY N/A 9/7/2018    Procedure: ANGIOGRAM, CORONARY ARTERY;  Surgeon: Rosa Maxwell MD;  Location: [facility] CATH;  Service: Cardiology;  Laterality: N/A;    CORONARY STENT PLACEMENT N/A 9/7/2018    Procedure: GAVIOTA RCA;  Surgeon: Rosa Maxwell MD;  Location: [facility]  CATH;  Service: Cardiology;  Laterality: N/A;    EYE SURGERY  2010    HYSTERECTOMY      LEFT HEART CATHETERIZATION Left 9/7/2018    Procedure: Left heart cath;  Surgeon: Isi Hartley MD;  Location: [facility] CATH;  Service: Cardiology;  Laterality: Left;    SINUS SURGERY  2014    TONSILLECTOMY       Family History   Problem Relation Age of Onset    Cancer Father         kidney     Social History     Tobacco Use    Smoking status: Current Some Day Smoker     Packs/day: 0.50     Types: Cigarettes    Smokeless tobacco: Never Used   Substance Use Topics    Alcohol use: No     Alcohol/week: 0.0 standard drinks    Drug use: No     Review of Systems   Constitutional: Negative for fever.   HENT: Negative for congestion.    Eyes: Positive for pain, redness and visual disturbance.   Respiratory: Negative for shortness of breath.    Cardiovascular: Negative for chest pain.   Gastrointestinal: Negative for abdominal pain.   Endocrine: Negative for polydipsia and polyuria.   Genitourinary: Negative for dysuria.   Musculoskeletal: Negative for back pain.   Skin: Negative for wound.   Allergic/Immunologic: Negative for immunocompromised state.   Neurological: Negative for headaches.   Hematological: Bruises/bleeds easily.   Psychiatric/Behavioral: Negative for behavioral problems.   All other systems reviewed and are negative.      Physical Exam     Initial Vitals [10/17/19 2209]   BP Pulse Resp Temp SpO2   (!) 161/71 65 18 98.7 °F (37.1 °C) 99 %      MAP       --         Physical Exam    Nursing note and vitals reviewed.  Constitutional: She appears well-developed.   HENT:   Head: Normocephalic.   Eyes: Pupils are equal, round, and reactive to light.   L eye appears proptotic, conjunctiva is injected  There is extraocular movement intact in left eye but L and right lateral gaze limited compared to R eye   Neck: Neck supple.   Cardiovascular: Normal rate, regular rhythm and intact distal pulses.   Abdominal: Soft.  "  Neurological: She is alert.   Skin: Skin is warm.   Psychiatric: She has a normal mood and affect.         ED Course   Procedures  Labs Reviewed - No data to display       Imaging Results    None          Medical Decision Making:   History:   I obtained history from: someone other than patient and EMS provider.       <> Summary of History: Pt's son  Old Medical Records: I decided to obtain old medical records.  Old Records Summarized: other records.       <> Summary of Records: CT head 10/17/19:  "1. No acute intracranial abnormality is visualized.  2. Moderate generalized involutional changes are seen along with findings most compatible with chronic microvascular ischemic changes.  3. There appears to be evidence of left orbital floor fracture which has been repaired with metallic mesh appearing material along the inferomedial and posterior left orbit.  This is similar to findings from CT dated 06/24/2019.  However, there appears to be interval development of soft tissue attenuation along the lateral and superior margin of the mesh which measures up to 7 mm in thickness with adjacent orbital fat stranding.  This is suspected encase the inferior rectus muscle.  Please correlate with the patient's extraocular muscle exam.  This raises concern for possible hematoma.  There is a suggestion of mild mass effect along the inferior medial margin of the posterior globe.  The possibility that this is a result of neoplasm cannot be entirely excluded, but this is much less favored.  The possibility of increased left orbital pressure and risk to the optic nerve should be considered.  Ophthalmology consultation is recommended.  4. Additional findings and details as above.  5. The findings and recommendations were reported to Dr. Hernadez in the emergency room on 10/17/2019 at 18:25.  Electronically signed by: Raymond Borrego MD  Date: 10/17/2019  Time: 18:26"  Initial Assessment:   Pt with decreased left visual acuity starting this " morning when she noted eye swelling and bruising  Differential Diagnosis:   Retrobulbar hematoma, conjunctival abrasion/ulcer, hyphema  Clinical Tests:   Lab Tests: Reviewed  Radiological Study: Reviewed  ED Management:  10:19 PM Ophthalmology paged  10:25 PM spoke with Christina ophthalmology who will consult on the patient, case including presentation and imaging discussed    Pt was seen by ophthalmology in the ED and a lateral canthotomy was performed at bedside  Recommended admission to medicine, will evaluate her again in the morning, also recommend erythromycin ointment her left eye and incision 3 times a day                  Attending Attestation:   Physician Attestation Statement for Resident:  As the supervising MD I was personally present during the critical portions of the procedure(s) performed by the resident and was immediately available in the ED to provide services and assistance as needed during the entire procedure.                    ED Course as of Oct 18 0148   Fri Oct 18, 2019   0139 Current /70    [GK]      ED Course User Index  [GK] Linda Osullivan MD     Clinical Impression:       ICD-10-CM ICD-9-CM   1. Hypertension, unspecified type I10 401.9   2. Acute loss of vision, left H53.132 368.11   3. Anticoagulated Z79.01 V58.61   4. Nontraumatic hematoma of left orbit H05.232 376.32                                Linda Osullivan MD  10/18/19 0149

## 2019-10-18 NOTE — PLAN OF CARE
Problem: Fall Injury Risk  Goal: Absence of Fall and Fall-Related Injury  Outcome: Ongoing, Progressing  Intervention: Identify and Manage Contributors to Fall Injury Risk  Flowsheets (Taken 10/18/2019 1607)  Self-Care Promotion: safe use of adaptive equipment encouraged; meal setup provided  Medication Review/Management: medications reviewed  Intervention: Promote Injury-Free Environment  Flowsheets (Taken 10/18/2019 1607)  Safety Promotion/Fall Prevention: assistive device/personal item within reach; bed alarm set; Fall Risk reviewed with patient/family; Fall Risk signage in place; family to remain at bedside; medications reviewed; nonskid shoes/socks when out of bed; side rails raised x 2; toileting scheduled; supervised activity; instructed to call staff for mobility     Problem: Adult Inpatient Plan of Care  Goal: Plan of Care Review  Outcome: Ongoing, Progressing  Goal: Patient-Specific Goal (Individualization)  Outcome: Ongoing, Progressing  Goal: Absence of Hospital-Acquired Illness or Injury  Outcome: Ongoing, Progressing  Intervention: Prevent VTE (venous thromboembolism)  Flowsheets (Taken 10/18/2019 1607)  VTE Prevention/Management: ambulation promoted; bleeding precautions maintained; bleeding risk assessed; fluids promoted; ROM (active) performed  Goal: Optimal Comfort and Wellbeing  Outcome: Ongoing, Progressing  Goal: Readiness for Transition of Care  Outcome: Ongoing, Progressing  Goal: Rounds/Family Conference  Outcome: Ongoing, Progressing     Problem: Infection  Goal: Infection Symptom Resolution  Outcome: Ongoing, Progressing  Intervention: Prevent or Manage Infection  Flowsheets (Taken 10/18/2019 1607)  Fever Reduction/Comfort Measures: fluid intake increased  Infection Management: aseptic technique maintained  Isolation Precautions: protective environment maintained     Pt awake and alert, able to make needs known. Int confusion, redirected at times. Vision somewhat better in left eye per  pt. Family at bedside. Amb with one person assist. Erythromycin oint to left eye continues, minimal serosanguinous. Bed low and locked and call light within reach.

## 2019-10-18 NOTE — ASSESSMENT & PLAN NOTE
Patient presents with left eye pain, swelling and blurry vision. She is receiving apixaban for recently diagnosed PE and ASA for CAD S/P stent. Last time she received apixaban was the morning of presentation the the ED. CTH: Left eye hematoma. Ophthalmology consulted impression is: vision threatening orbital hemorrhage and underwent bedside lateral canthotomy. Case discussed with Dr. Christina Coleman MD - Ophthalmology who is plan is: resuming/holding anticoagulation is up to internal medicine. Unable to tell if the hematoma controlled or there is still active bleeding. It was likely bleeding at one point since the patient got worse throughtout the day however ophthalmology don't have definitive way of telling whether is still bleeding or not. There have released the pressure in her orbit and will re check her at AM. Case also discussed with Dr. Audrey Iyer - Hematology fellow who recommends to hold all kind of anticoagulation (including heparin) and aspirin for the time being until assessed by hematology at AM. We discussed the risk benefit of bleeding vs clotting with the patient and the family (Son: Karina Koroma) who both agree to hold anticoagulants for the time being and they understand the risk of clotting/thrombosis with holding treatment.     Plan:  - Anticoagulations held for now, addressed risks and benefits with patient and family and will hold until seen by optho in 2 weeks  - Follow up with ophthalmology Dr. Martinez in 2 weeks upon discharge

## 2019-10-18 NOTE — PLAN OF CARE
Problem: Physical Therapy Goal  Goal: Physical Therapy Goal  Outcome: Met   Eval and D/C from acute PT services    Cory Pérez PT,DPT  10/18/2019

## 2019-10-18 NOTE — DISCHARGE SUMMARY
[facility] Hospital Medicine  Discharge Summary      Patient Name: Laura Koroma  MRN: [id number]  Admission Date: 10/17/2019  Hospital Length of Stay: 0 days  Discharge Date and Time:  10/18/2019 4:37 PM  Attending Physician: Loida Polanco MD   Discharging Provider: Juan Calderon MD  Primary Care Provider: Jinny Marquez MD  [facility] Medicine Team: [facility] HOSP MED 4 Juan Calderon MD    HPI:   73 year old female patient with PE 10/9/2019 on apixaban, parkinson's, Lewy body dementia, hypertension, dyslipidemia, hypothyroidism, acid reflux, CAD s/p PCI of RCA in 9/18, followed by orbital atherectomy and stenting of the ostial LAD with overlapping stents extending into the left main, PAC's, tobacco use and metastatic breast cancer currently on daily exemestane.      Patient presents to the ED complaninig of left eye pain for 1 day     Patient states for the last 1 day before presentation she has been having progressive left eye pain 10/10 severity. Her pain is exacerbated when she moves her eye.  She denies preceeding trauma, injury or fall. She also denies preceding infection. Her eye then started to be progressively swollen with associated blurry vision so she came to the ED. The patient has a history of an orbital floor fracture and repair after a fall 3 years ago Patient denies fever, unexpected weight change, fatigue, sore throat, chest pain, SOB, Palpitations, leg swelling, N/V, change in bowel habits and urinary symptoms. She also denies dizziness, light headedness and confusion.  Patient reports abdominal pain due to enoxaparin injections that she was receiving during her last hospitalization when she was diagnosed with PE 10/9/2019. Patient also reports spots of blood in stool due to her hemorrhoids.    Smoking History:  Patient is active smoker 1/2 pack / day for many years     Medications History:  Patient is getting Apixaban BID for recently diagnosed PE last dose was at the morning  of presentation to the ED     In ED CT head 10/17/2019 showed: soft tissue attenuation along the lateral and superior margin of the mesh which measures up to 7 mm in thickness with adjacent orbital fat stranding.  This is suspected encase the inferior rectus muscle  raises concern for possible hematoma. Ophthalmology consulted patient was diagnosed as vision threatening orbital hemorrhage and underwent bedside lateral canthotomy .     * No surgery found *      Hospital Course:   10/18-patient with improving vision overnight and no new complaints; doppler scan of LE's negative for DVT    Review of Systems   Constitutional: Negative for fatigue, fever and unexpected weight change.   HENT: Negative for sore throat.    Eyes: Positive for redness and visual disturbance. Negative for photophobia, pain, discharge and itching.   Respiratory: Negative for cough, choking, shortness of breath and wheezing.    Cardiovascular: Negative for chest pain, palpitations and leg swelling.   Gastrointestinal: Positive for blood in stool. Negative for abdominal pain, constipation, diarrhea, nausea and vomiting.   Genitourinary: Negative for dysuria and hematuria.   Neurological: Negative for dizziness, seizures, syncope and light-headedness.   Psychiatric/Behavioral: Negative for confusion.     Objective:     Vital Signs (Most Recent):  Temp: 96.6 °F (35.9 °C) (10/18/19 1146)  Pulse: (!) 56 (10/18/19 1146)  Resp: 18 (10/18/19 1146)  BP: (!) 188/77 (10/18/19 1146)  SpO2: 100 % (10/18/19 1146) Vital Signs (24h Range):  Temp:  [96.6 °F (35.9 °C)-99 °F (37.2 °C)] 96.6 °F (35.9 °C)  Pulse:  [56-69] 56  Resp:  [14-21] 18  SpO2:  [90 %-100 %] 100 %  BP: (139-208)/(63-86) 188/77     Weight: 60.3 kg (133 lb)  Body mass index is 22.83 kg/m².    Physical Exam   Constitutional:   Patient on 2 L NC   Eyes: Left eye exhibits chemosis. Left eye exhibits no discharge and no exudate. No foreign body present in the left eye. Left conjunctiva is injected.  Left conjunctiva has a hemorrhage. Left eye exhibits normal extraocular motion.   Decreased left eye vision but improving   Neck: Neck supple. No JVD present.   Cardiovascular: Normal rate and regular rhythm. Exam reveals no gallop and no friction rub.   No murmur heard.  Pulmonary/Chest: She has no wheezes. She has no rales.   Abdominal: Soft. She exhibits no distension. Tenderness: Lower abdomen.   Hematoma of the lower abdomen   Musculoskeletal: She exhibits no edema.   Neurological: She is alert.   Nursing note and vitals reviewed.    Consults:   Consults (From admission, onward)        Status Ordering Provider     Inpatient consult to Hematology  Once     Provider:  (Not yet assigned)    Completed ILEANA MENDOZA     Inpatient consult to Ophthalmology  Once     Provider:  (Not yet assigned)    Completed SOSA FERNANDES          * Hemorrhage of left orbit  Patient presents with left eye pain, swelling and blurry vision. She is receiving apixaban for recently diagnosed PE and ASA for CAD S/P stent. Last time she received apixaban was the morning of presentation the the ED. CTH: Left eye hematoma. Ophthalmology consulted impression is: vision threatening orbital hemorrhage and underwent bedside lateral canthotomy. Case discussed with Dr. Christina Coleman MD - Ophthalmology who is plan is: resuming/holding anticoagulation is up to internal medicine. Unable to tell if the hematoma controlled or there is still active bleeding. It was likely bleeding at one point since the patient got worse throughtout the day however ophthalmology don't have definitive way of telling whether is still bleeding or not. There have released the pressure in her orbit and will re check her at AM. Case also discussed with Dr. Audrey Iyer - Hematology fellow who recommends to hold all kind of anticoagulation (including heparin) and aspirin for the time being until assessed by hematology at AM. We discussed the risk benefit of bleeding  vs clotting with the patient and the family (Son: Karina Koroma) who both agree to hold anticoagulants for the time being and they understand the risk of clotting/thrombosis with holding treatment.     Plan:  - Anticoagulations held for now, addressed risks and benefits with patient and family and will hold until seen by optho in 2 weeks  - Follow up with ophthalmology Dr. Martinez in 2 weeks upon discharge    Parkinson disease  Resume home medications carbidopa-levodopa      Chronic respiratory failure with hypoxia  Since she was diagnosed with PE patient is on 2 L oxygen through NC. Currently maintaining SpO2      Acute loss of vision, left  - Hemorrhage of left orbit      Lewy body dementia  Resume home medication donepezil and carbidopa levodopa      Discharge planning issues  PT/OT      S/P right coronary artery (RCA) stent placement x 2  Coronary artery disease without angina pectoris      CKD (chronic kidney disease) stage 3, GFR 30-59 ml/min  Cr on presentation is 1 GFR: 54     Plan:  Daily weight  Strict intake output  Avoid nephrotoxic medications  Adjust medications to GFR  Daily RFPs      Tobacco use  Counseling about smoking cessation      Malignant neoplasm of left female breast   metastatic breast cancer currently on daily exemestane.    Could not order home medication (non-formulary)      Breast cancer        Hypothyroidism  Resume home medication Levothyroxine   TSH level      Essential hypertension  Resume home medications: Lisinopril and nebivolol  Monitor BP and adjust medications accordingly        Coronary artery disease without angina pectoris  Patient denies chest pain she is on ASA 81 mg PO OD. Will determine whether to restart anticoagulation based on patient preference        Final Active Diagnoses:    Diagnosis Date Noted POA    PRINCIPAL PROBLEM:  Hemorrhage of left orbit [H05.232] 10/18/2019 Yes    Acute loss of vision, left [H53.132] 10/18/2019 Yes    Chronic respiratory failure with  hypoxia [J96.11] 10/18/2019 Yes    Parkinson disease [G20] 10/18/2019 Yes    Abdominal hematoma [S30.1XXA] 10/18/2019 Yes    Lewy body dementia [G31.83, F02.80] 10/09/2019 Yes    Pulmonary embolism [I26.99] 10/09/2019 Yes    Discharge planning issues [Z02.9] 12/09/2018 Not Applicable    S/P right coronary artery (RCA) stent placement x 2 [Z95.5] 10/03/2018 Not Applicable    CKD (chronic kidney disease) stage 3, GFR 30-59 ml/min [N18.3] 09/18/2018 Yes    Tobacco use [Z72.0] 09/06/2017 Yes    Malignant neoplasm of left female breast  [C50.912] 02/02/2017 Yes    Coronary artery disease without angina pectoris [I25.10] 08/28/2015 Yes    Essential hypertension [I10] 08/28/2015 Yes    Hypothyroidism [E03.9] 08/28/2015 Yes    Breast cancer [C50.919] 08/28/2015 Yes      Problems Resolved During this Admission:       Discharged Condition: stable    Disposition: Home or Self Care    Follow Up:  Follow-up Information     Antonia Martinez MD In 2 weeks.    Specialty:  Ophthalmology  Why:  Please be sure to schedule a follow up appointment with Dr. Martinez in 2 weeks.  Contact information:  [address] [place] [address] [id number] [phone]                 Patient Instructions:      Ambulatory Referral to Ophthalmology   Referral Priority: Routine Referral Type: Consultation   Referral Reason: Specialty Services Required   Requested Specialty: Ophthalmology   Number of Visits Requested: 1     Diet Cardiac     Notify your health care provider if you experience any of the following:  temperature >100.4     Notify your health care provider if you experience any of the following:  persistent nausea and vomiting or diarrhea     Notify your health care provider if you experience any of the following:  severe uncontrolled pain     Notify your health care provider if you experience any of the following:  redness, tenderness, or signs of infection (pain, swelling, redness, odor or green/yellow discharge around incision site)      Notify your health care provider if you experience any of the following:  difficulty breathing or increased cough     Notify your health care provider if you experience any of the following:  severe persistent headache     Notify your health care provider if you experience any of the following:  worsening rash     Notify your health care provider if you experience any of the following:  persistent dizziness, light-headedness, or visual disturbances     Notify your health care provider if you experience any of the following:  increased confusion or weakness     Activity as tolerated       Significant Diagnostic Studies: Labs: All labs within the past 24 hours have been reviewed    Pending Diagnostic Studies:     None         Medications:  Reconciled Home Medications:      Medication List      START taking these medications    erythromycin ophthalmic ointment  Commonly known as:  ROMYCIN  Place into the left eye every 8 (eight) hours.        CHANGE how you take these medications    ALPRAZolam 1 MG tablet  Commonly known as:  XANAX  Take 1 mg by mouth 2 (two) times daily.  What changed:  Another medication with the same name was removed. Continue taking this medication, and follow the directions you see here.     diclofenac sodium 1 % Gel  Commonly known as:  VOLTAREN  Apply TWO grams topically ONCE daily.  What changed:  See the new instructions.     nystatin powder  Commonly known as:  MYCOSTATIN  Apply topically 4 (four) times daily.  What changed:    · how much to take  · when to take this  · reasons to take this     ondansetron 8 MG Tbdl  Commonly known as:  ZOFRAN-ODT  DISSOLVE 1 TABLET UNDER TONGUE EVERY 8 HOURS AS NEEDED FOR NAUSEA  What changed:  See the new instructions.        CONTINUE taking these medications    carbidopa-levodopa  mg  mg per tablet  Commonly known as:  SINEMET  Take 0.5 tablets by mouth 3 (three) times daily.     cetirizine 10 MG tablet  Commonly known as:  ZYRTEC  Take 10  mg by mouth once daily.     clonazePAM 2 MG Tab  Commonly known as:  KLONOPIN  Take 2 mg by mouth every evening.     exemestane 25 mg tablet  Commonly known as:  AROMASIN  Take 1 tablet (25 mg total) by mouth once daily.     famotidine 20 MG tablet  Commonly known as:  PEPCID  Take 20 mg by mouth nightly as needed for Heartburn.     fentaNYL 75 mcg/hr  Commonly known as:  DURAGESIC  Place 1 patch onto the skin every 48 hours.     HYDROcodone-acetaminophen  mg per tablet  Commonly known as:  NORCO  Take 1 tablet by mouth every 6 (six) hours as needed for Pain.     ibuprofen 200 MG tablet  Commonly known as:  ADVIL,MOTRIN  Take 600 mg by mouth daily as needed for Pain.     levothyroxine 125 MCG tablet  Commonly known as:  SYNTHROID  Take 1 tablet (125 mcg total) by mouth before breakfast.     lisinopril 40 MG tablet  Commonly known as:  PRINIVIL,ZESTRIL  Take 1 tablet (40 mg total) by mouth once daily.     megestrol 400 mg/10 mL (40 mg/mL) Susp  Commonly known as:  MEGACE  Take 1,600 mg by mouth once daily.     mirabegron 25 mg Tb24 ER tablet  Commonly known as:  MYRBETRIQ  Take 25 mg by mouth every evening.     nebivolol 10 MG Tab  Commonly known as:  BYSTOLIC  Take 1 tablet (10 mg total) by mouth every evening.     NUPLAZID 34 mg Cap  Generic drug:  pimavanserin  Take 34 mg by mouth every morning.     TRINTELLIX 10 mg Tab  Generic drug:  vortioxetine  Take 10 mg by mouth once daily.     XGEVA 120 mg/1.7 mL (70 mg/mL) Soln  Generic drug:  denosumab  Inject 120 mg into the skin every 30 days. Last dose 10/08/2019        STOP taking these medications    apixaban 5 mg (74 tabs) Dspk  Commonly known as:  ELIQUIS     aspirin 81 MG EC tablet  Commonly known as:  ECOTRIN            Indwelling Lines/Drains at time of discharge:   Lines/Drains/Airways     Central Venous Catheter Line                 Port A Cath Single Lumen left subclavian -- days                Time spent on the discharge of patient: 35  minutes  Patient was seen and examined on the date of discharge and determined to be suitable for discharge.         Juan Calderon MD  Department of Hospital Medicine  [facility]

## 2019-10-18 NOTE — ASSESSMENT & PLAN NOTE
Resume home medications: Lisinopril and nebivolol  Monitor BP and adjust medications accordingly

## 2019-10-18 NOTE — HPI
73 year old female patient with PE 10/9/2019 on apixaban, parkinson's, Lewy body dementia, hypertension, dyslipidemia, hypothyroidism, acid reflux, CAD s/p PCI of RCA in 9/18, followed by orbital atherectomy and stenting of the ostial LAD with overlapping stents extending into the left main, PAC's, tobacco use and metastatic breast cancer currently on daily exemestane.      Patient presents to the ED complaninig of left eye pain for 1 day     Patient states for the last 1 day before presentation she has been having progressive left eye pain 10/10 severity. Her pain is exacerbated when she moves her eye. She denies preceeding trauma, injury or fall. She also denies preceding infection. Her eye then started to be progressively swollen with associated blurry vision so she came to the ED. The patient has a history of an orbital floor fracture and repair after a fall 3 years ago Patient denies fever, unexpected weight change, fatigue, sore throat, chest pain, SOB, Palpitations, leg swelling, N/V, change in bowel habits and urinary symptoms. She also denies dizziness, light headedness and confusion.  Patient reports abdominal pain due to enoxaparin injections that she was receiving during her last hospitalization when she was diagnosed with PE 10/9/2019. Patient also reports spots of blood in stool due to her hemorrhoids.    Smoking History:  Patient is active smoker 1/2 pack / day for many years     Medications History:  Patient is getting Apixaban BID for recently diagnosed PE last dose was at the morning of presentation to the ED     In ED CT head 10/17/2019 showed: soft tissue attenuation along the lateral and superior margin of the mesh which measures up to 7 mm in thickness with adjacent orbital fat stranding.  This is suspected encase the inferior rectus muscle  raises concern for possible hematoma. Ophthalmology consulted patient was diagnosed as vision threatening orbital hemorrhage and underwent bedside lateral  canthotomy .

## 2019-10-18 NOTE — PROGRESS NOTES
[facility]  Ophthalmology  Progress Note    Patient Name: Laura Koroma  MRN: [id number]  Admission Date: 10/17/2019  Hospital Length of Stay: 0 days  Attending Provider: Loida Polanco MD   Primary Care Physician: Jinny Marquez MD  Principal Problem:Hemorrhage of left orbit    Subjective:     Interval History: Pt feeling better today. Eye pain is reduced, just feels tired now. States that vision is much improved and almost at baseline.    No new subjective & objective note has been filed under this hospital service since the last note was generated.      Base Eye Exam     Visual Acuity (Snellen - Linear)       Right Left    Dist cc 20/40 20/50   Lacking effort           Tonometry (Tonopen, 5:07 PM)       Right Left    Pressure 14 22          Pupils       Dark Light Shape React APD    Right 5 4.5 Round Minimal mild    Left 4 3 Round 3 None          Extraocular Movement       Right Left     Full      -- -- --   --  --   -- -- --    -- -1 --   -2  -2   -- -1 --               Slit Lamp and Fundus Exam     External Exam       Right Left    External Normal periorbital edema and ecchymosis, Proptosis          Pen Light Exam       Right Left    Lids/Lashes Normal periorbital edema and ecchymosis, lateral cantholysis    Conjunctiva/Sclera White and quiet 2+ Injection, some temporal chemosis    Cornea Clear Clear    Anterior Chamber Deep and formed Deep and formed    Iris round and reactive minimally reactive, sluggish, round    Lens Posterior chamber intraocular lens Posterior chamber intraocular lens              Assessment and Plan:     1. Orbital hemorrhage, left eye, s/p lateral canthotomy  - Va and IOP improved today  - still has defects in EOMs  - continue erythromycin ointment to L lateral canthus TID  - ok to discharge from ophthalmology standpoint  - Patient will be scheduled in clinic with Dr. Martinez within 2 weeks.      Halie Winchester MD, PGY2  Ophthalmology  [facility]

## 2019-10-22 ENCOUNTER — TELEPHONE (OUTPATIENT)
Dept: OPHTHALMOLOGY | Facility: CLINIC | Age: 74
End: 2019-10-22

## 2019-10-23 ENCOUNTER — TELEPHONE (OUTPATIENT)
Dept: OPHTHALMOLOGY | Facility: CLINIC | Age: 74
End: 2019-10-23

## 2019-10-25 ENCOUNTER — TELEPHONE (OUTPATIENT)
Dept: OPHTHALMOLOGY | Facility: CLINIC | Age: 74
End: 2019-10-25

## 2019-10-25 NOTE — TELEPHONE ENCOUNTER
----- Message from Alejandrina Lyman sent at 10/25/2019  1:35 PM CDT -----  Contact: Angelika daughter  Type:  Sooner Apoointment Request    Caller is requesting a sooner appointment.  Caller declined first available appointment listed below.  Caller will not accept being placed on the waitlist and is requesting a message be sent to doctor.    Name of Caller:  Angelika  When is the first available appointment?  11/11/19/ pt had appt for today at 3:00 cancelled due to sick  Symptoms:  appt is for surgery follow up  Best Call Back Number:  [phone]  Additional Information:  Pls call pt to reschedule

## 2019-11-05 ENCOUNTER — OFFICE VISIT (OUTPATIENT)
Dept: OPHTHALMOLOGY | Facility: CLINIC | Age: 74
End: 2019-11-05
Payer: MEDICARE

## 2019-11-05 DIAGNOSIS — H05.239 RETROBULBAR HEMORRHAGE: ICD-10-CM

## 2019-11-05 DIAGNOSIS — H02.22B MECHANICAL LAGOPHTHALMOS OF UPPER AND LOWER EYELID OF LEFT EYE: ICD-10-CM

## 2019-11-05 DIAGNOSIS — H05.20 EXOPHTHALMOS OF LEFT EYE: Primary | ICD-10-CM

## 2019-11-05 PROCEDURE — 92004 PR EYE EXAM, NEW PATIENT,COMPREHESV: ICD-10-PCS | Mod: S$PBB,,, | Performed by: OPHTHALMOLOGY

## 2019-11-05 PROCEDURE — 99999 PR PBB SHADOW E&M-EST. PATIENT-LVL III: ICD-10-PCS | Mod: PBBFAC,,, | Performed by: OPHTHALMOLOGY

## 2019-11-05 PROCEDURE — 99213 OFFICE O/P EST LOW 20 MIN: CPT | Mod: PBBFAC,PO | Performed by: OPHTHALMOLOGY

## 2019-11-05 PROCEDURE — 99999 PR PBB SHADOW E&M-EST. PATIENT-LVL III: CPT | Mod: PBBFAC,,, | Performed by: OPHTHALMOLOGY

## 2019-11-05 PROCEDURE — 92004 COMPRE OPH EXAM NEW PT 1/>: CPT | Mod: S$PBB,,, | Performed by: OPHTHALMOLOGY

## 2019-11-05 RX ORDER — HYDROCODONE BITARTRATE AND ACETAMINOPHEN 10; 325 MG/1; MG/1
TABLET ORAL
COMMUNITY
End: 2019-11-06 | Stop reason: SDUPTHER

## 2019-11-05 RX ORDER — NITROGLYCERIN 0.4 MG/1
TABLET SUBLINGUAL
COMMUNITY

## 2019-11-05 RX ORDER — LISINOPRIL 40 MG/1
TABLET ORAL
COMMUNITY

## 2019-11-05 RX ORDER — DONEPEZIL HYDROCHLORIDE 10 MG/1
5 TABLET, FILM COATED ORAL DAILY
Refills: 2 | COMMUNITY
Start: 2019-09-02 | End: 2020-03-06

## 2019-11-05 RX ORDER — BISACODYL 5 MG
TABLET, DELAYED RELEASE (ENTERIC COATED) ORAL
COMMUNITY

## 2019-11-05 RX ORDER — DIPHENOXYLATE HYDROCHLORIDE AND ATROPINE SULFATE 2.5; .025 MG/1; MG/1
TABLET ORAL
COMMUNITY
End: 2019-12-04 | Stop reason: SDUPTHER

## 2019-11-05 RX ORDER — LEVOTHYROXINE SODIUM 125 UG/1
TABLET ORAL
COMMUNITY

## 2019-11-05 RX ORDER — ONDANSETRON 8 MG/1
TABLET, ORALLY DISINTEGRATING ORAL
COMMUNITY
End: 2019-12-17 | Stop reason: SDUPTHER

## 2019-11-05 RX ORDER — CARBIDOPA AND LEVODOPA 25; 100 MG/1; MG/1
TABLET ORAL
COMMUNITY

## 2019-11-05 RX ORDER — FAMOTIDINE 20 MG/1
TABLET, FILM COATED ORAL
COMMUNITY

## 2019-11-05 RX ORDER — LORATADINE 5 MG/5 ML
SOLUTION, ORAL ORAL
Refills: 5 | COMMUNITY
Start: 2019-10-24 | End: 2019-11-25

## 2019-11-05 RX ORDER — BRIMONIDINE TARTRATE 2 MG/ML
1 SOLUTION/ DROPS OPHTHALMIC 2 TIMES DAILY
Qty: 10 ML | Refills: 3 | Status: SHIPPED | OUTPATIENT
Start: 2019-11-05 | End: 2020-02-03

## 2019-11-05 RX ORDER — NYSTATIN 100000 [USP'U]/G
POWDER TOPICAL
COMMUNITY

## 2019-11-05 RX ORDER — ALPRAZOLAM 1 MG/1
TABLET ORAL
COMMUNITY

## 2019-11-05 RX ORDER — CLONAZEPAM 2 MG/1
TABLET ORAL
COMMUNITY

## 2019-11-05 NOTE — PROGRESS NOTES
HPI     Post-op Evaluation      Additional comments: pt had retinal bleed OS sx 10/18/2019//              Comments     pt had retinal bleed OS sx 10/18/2019// red eye, had spontaneous bleed   OS, PCP-jimi [facility], then to [facility]//   Pt states extremely blurred in OS,  Had blood red OS in AM of waking then   got worse after that//          Last edited by Emily Shi on 11/5/2019  3:20 PM. (History)            Assessment /Plan     For exam results, see Encounter Report.    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

## 2019-11-05 NOTE — PATIENT INSTRUCTIONS
-Daily lubricants to the left eye systane 4x daily with nighttime ointment  -Brimonidine 1drop 2x daily left eye

## 2019-11-06 ENCOUNTER — INFUSION (OUTPATIENT)
Dept: INFUSION THERAPY | Facility: HOSPITAL | Age: 74
End: 2019-11-06
Attending: INTERNAL MEDICINE
Payer: MEDICARE

## 2019-11-06 VITALS
TEMPERATURE: 98 F | HEIGHT: 64 IN | BODY MASS INDEX: 21.51 KG/M2 | DIASTOLIC BLOOD PRESSURE: 72 MMHG | HEART RATE: 57 BPM | WEIGHT: 126 LBS | RESPIRATION RATE: 18 BRPM | SYSTOLIC BLOOD PRESSURE: 170 MMHG

## 2019-11-06 DIAGNOSIS — C50.919 METASTATIC BREAST CANCER: Primary | ICD-10-CM

## 2019-11-06 DIAGNOSIS — C50.912 MALIGNANT NEOPLASM OF LEFT FEMALE BREAST, UNSPECIFIED ESTROGEN RECEPTOR STATUS, UNSPECIFIED SITE OF BREAST: ICD-10-CM

## 2019-11-06 PROCEDURE — 96372 THER/PROPH/DIAG INJ SC/IM: CPT | Mod: PN

## 2019-11-06 PROCEDURE — 63600175 PHARM REV CODE 636 W HCPCS: Mod: JG,PN | Performed by: INTERNAL MEDICINE

## 2019-11-06 RX ORDER — SODIUM CHLORIDE 0.9 % (FLUSH) 0.9 %
10 SYRINGE (ML) INJECTION
Status: CANCELLED | OUTPATIENT
Start: 2019-12-02

## 2019-11-06 RX ORDER — HEPARIN 100 UNIT/ML
500 SYRINGE INTRAVENOUS
Status: CANCELLED | OUTPATIENT
Start: 2019-12-02

## 2019-11-06 RX ADMIN — DENOSUMAB 120 MG: 120 INJECTION SUBCUTANEOUS at 09:11

## 2019-11-25 ENCOUNTER — PATIENT MESSAGE (OUTPATIENT)
Dept: OPHTHALMOLOGY | Facility: CLINIC | Age: 74
End: 2019-11-25

## 2019-11-25 ENCOUNTER — TELEPHONE (OUTPATIENT)
Dept: OPHTHALMOLOGY | Facility: CLINIC | Age: 74
End: 2019-11-25

## 2019-11-25 NOTE — TELEPHONE ENCOUNTER
Called and spoke to pt's son about mother's appointment. Pt notified me that they will be here in the morning.

## 2019-11-26 ENCOUNTER — TELEPHONE (OUTPATIENT)
Dept: OPHTHALMOLOGY | Facility: CLINIC | Age: 74
End: 2019-11-26

## 2019-11-26 ENCOUNTER — OFFICE VISIT (OUTPATIENT)
Dept: OPHTHALMOLOGY | Facility: CLINIC | Age: 74
End: 2019-11-26
Payer: MEDICARE

## 2019-11-26 ENCOUNTER — PATIENT MESSAGE (OUTPATIENT)
Dept: OPHTHALMOLOGY | Facility: CLINIC | Age: 74
End: 2019-11-26

## 2019-11-26 DIAGNOSIS — H05.239 RETROBULBAR HEMORRHAGE: ICD-10-CM

## 2019-11-26 DIAGNOSIS — H02.22B MECHANICAL LAGOPHTHALMOS OF UPPER AND LOWER EYELID OF LEFT EYE: ICD-10-CM

## 2019-11-26 DIAGNOSIS — H05.20 EXOPHTHALMOS OF LEFT EYE: Primary | ICD-10-CM

## 2019-11-26 PROCEDURE — 99999 PR PBB SHADOW E&M-EST. PATIENT-LVL II: CPT | Mod: PBBFAC,,, | Performed by: OPHTHALMOLOGY

## 2019-11-26 PROCEDURE — 92012 INTRM OPH EXAM EST PATIENT: CPT | Mod: S$PBB,,, | Performed by: OPHTHALMOLOGY

## 2019-11-26 PROCEDURE — 99999 PR PBB SHADOW E&M-EST. PATIENT-LVL II: ICD-10-PCS | Mod: PBBFAC,,, | Performed by: OPHTHALMOLOGY

## 2019-11-26 PROCEDURE — 92012 PR EYE EXAM, EST PATIENT,INTERMED: ICD-10-PCS | Mod: S$PBB,,, | Performed by: OPHTHALMOLOGY

## 2019-11-26 PROCEDURE — 99212 OFFICE O/P EST SF 10 MIN: CPT | Mod: PBBFAC,PO | Performed by: OPHTHALMOLOGY

## 2019-11-26 RX ORDER — ERYTHROMYCIN 5 MG/G
OINTMENT OPHTHALMIC EVERY 8 HOURS
Qty: 3.5 G | Refills: 1 | Status: SHIPPED | OUTPATIENT
Start: 2019-11-26 | End: 2020-03-06

## 2019-11-26 NOTE — PROGRESS NOTES
"HPI     Urgent care-OS swollen    Pt complains of eye swelling x 3 days. Last seen for 11/5/19 for   Exomphalos. Pt went to ER yesterday and had CT scan that showed   fluid/swelling behind eye-results in  Pt complains of slight "brusing   pain". Has respiratory issues over the weekend. Using brimonidine OS BID   and erythromycin saira. Pt feels eye is turning upward.     Last edited by Karen Macario on 11/26/2019  8:37 AM. (History)            Assessment /Plan     For exam results, see Encounter Report.    Exophthalmos of left eye- increase fluid in orbit, no RAPD, Color vision WNL OU, intermittent diplopia, no pain; Hertels 95; OS 24 OD 22  -     Ambulatory referral to Ophthalmology  -     erythromycin (ROMYCIN) ophthalmic ointment; Place into the left eye every 8 (eight) hours.  Dispense: 3.5 g; Refill: 1  Referral oculoplastic for increase orbital fluid and possible need for revision of orbital fracture repair  Retrobulbar hemorrhage  -     Ambulatory referral to Ophthalmology  -     erythromycin (ROMYCIN) ophthalmic ointment; Place into the left eye every 8 (eight) hours.  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  Dispense: 3.5 g; Refill: 1  Follow up in about 2 weeks (around 12/10/2019) for f/u exophthalmos left eye.                     "

## 2019-11-26 NOTE — TELEPHONE ENCOUNTER
----- Message from Cheryle Quintana sent at 11/26/2019 10:16 AM CST -----  Dr Cash put in a referral for this pt to see Dr Martinez for Exophthalmos and retrobulbar hemorrhage OS. Could you please call her to schedule an appointment.     Thank you

## 2019-12-04 ENCOUNTER — INFUSION (OUTPATIENT)
Dept: INFUSION THERAPY | Facility: HOSPITAL | Age: 74
End: 2019-12-04
Attending: INTERNAL MEDICINE
Payer: MEDICARE

## 2019-12-04 VITALS
SYSTOLIC BLOOD PRESSURE: 182 MMHG | TEMPERATURE: 98 F | RESPIRATION RATE: 18 BRPM | HEART RATE: 63 BPM | DIASTOLIC BLOOD PRESSURE: 77 MMHG

## 2019-12-04 DIAGNOSIS — C50.919 METASTATIC BREAST CANCER: Primary | ICD-10-CM

## 2019-12-04 DIAGNOSIS — C50.912 MALIGNANT NEOPLASM OF LEFT FEMALE BREAST, UNSPECIFIED ESTROGEN RECEPTOR STATUS, UNSPECIFIED SITE OF BREAST: ICD-10-CM

## 2019-12-04 PROCEDURE — 63600175 PHARM REV CODE 636 W HCPCS: Mod: JG,PN | Performed by: INTERNAL MEDICINE

## 2019-12-04 PROCEDURE — 96372 THER/PROPH/DIAG INJ SC/IM: CPT | Mod: PN

## 2019-12-04 PROCEDURE — A4216 STERILE WATER/SALINE, 10 ML: HCPCS | Mod: PN | Performed by: INTERNAL MEDICINE

## 2019-12-04 PROCEDURE — 25000003 PHARM REV CODE 250: Mod: PN | Performed by: INTERNAL MEDICINE

## 2019-12-04 RX ORDER — SODIUM CHLORIDE 0.9 % (FLUSH) 0.9 %
10 SYRINGE (ML) INJECTION
Status: CANCELLED | OUTPATIENT
Start: 2019-12-30

## 2019-12-04 RX ORDER — SODIUM CHLORIDE 0.9 % (FLUSH) 0.9 %
10 SYRINGE (ML) INJECTION
Status: DISCONTINUED | OUTPATIENT
Start: 2019-12-04 | End: 2019-12-04 | Stop reason: HOSPADM

## 2019-12-04 RX ORDER — HEPARIN 100 UNIT/ML
500 SYRINGE INTRAVENOUS
Status: CANCELLED | OUTPATIENT
Start: 2019-12-30

## 2019-12-04 RX ADMIN — DENOSUMAB 120 MG: 120 INJECTION SUBCUTANEOUS at 12:12

## 2020-01-06 ENCOUNTER — OFFICE VISIT (OUTPATIENT)
Dept: OPHTHALMOLOGY | Facility: CLINIC | Age: 75
End: 2020-01-06
Payer: MEDICARE

## 2020-01-06 DIAGNOSIS — H02.22B MECHANICAL LAGOPHTHALMOS OF UPPER AND LOWER EYELID OF LEFT EYE: ICD-10-CM

## 2020-01-06 DIAGNOSIS — H05.239 RETROBULBAR HEMORRHAGE: ICD-10-CM

## 2020-01-06 DIAGNOSIS — H05.20 EXOPHTHALMOS OF LEFT EYE: Primary | ICD-10-CM

## 2020-01-06 PROCEDURE — 99999 PR PBB SHADOW E&M-EST. PATIENT-LVL III: CPT | Mod: PBBFAC,,, | Performed by: OPHTHALMOLOGY

## 2020-01-06 PROCEDURE — 99213 OFFICE O/P EST LOW 20 MIN: CPT | Mod: PBBFAC,PO | Performed by: OPHTHALMOLOGY

## 2020-01-06 PROCEDURE — 99999 PR PBB SHADOW E&M-EST. PATIENT-LVL III: ICD-10-PCS | Mod: PBBFAC,,, | Performed by: OPHTHALMOLOGY

## 2020-01-06 PROCEDURE — 92012 PR EYE EXAM, EST PATIENT,INTERMED: ICD-10-PCS | Mod: S$PBB,,, | Performed by: OPHTHALMOLOGY

## 2020-01-06 PROCEDURE — 92012 INTRM OPH EXAM EST PATIENT: CPT | Mod: S$PBB,,, | Performed by: OPHTHALMOLOGY

## 2020-01-06 NOTE — PROGRESS NOTES
HPI     Eye Problem      Additional comments: 12 wk f/u exophthalmos of left eye//  has pain not   constant//              Comments     2 wk f/u exophthalmos of left eye//  has pain not constant// pt feels   like there is a stitch in her eye that needs to come out//          Last edited by Emily Shi on 1/6/2020 10:43 AM. (History)        ROS     Negative for: Constitutional, Gastrointestinal, Neurological, Skin,   Genitourinary, Musculoskeletal, HENT, Endocrine, Cardiovascular, Eyes,   Respiratory, Psychiatric, Allergic/Imm, Heme/Lymph    Last edited by Ld Cash Jr., MD on 1/6/2020 11:17 AM. (History)        Assessment /Plan     For exam results, see Encounter Report.    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  -  Warm compresses to eyelids along with eyelid massages at least twice daily OU  -  Preservative free AT's 4-6x daliy OU; counseled patient on different brands that are available at pharmacies  -  Avoid Visine and Clear Eyes if they are not the preservative free brand  -  Discussed Gels and PM Ointment options  -  Counseled on external factors that can worsen symptom such as air vents and ceiling fans if they are blowing directly on patient for extended amounts of time  -  Counseled on taking more breaks when using the computer and reading    Follow up in about 1 year (around 1/6/2021) for Annual.

## 2020-01-07 ENCOUNTER — INFUSION (OUTPATIENT)
Dept: INFUSION THERAPY | Facility: HOSPITAL | Age: 75
End: 2020-01-07
Attending: INTERNAL MEDICINE
Payer: MEDICARE

## 2020-01-07 DIAGNOSIS — C50.912 MALIGNANT NEOPLASM OF LEFT FEMALE BREAST, UNSPECIFIED ESTROGEN RECEPTOR STATUS, UNSPECIFIED SITE OF BREAST: ICD-10-CM

## 2020-01-07 DIAGNOSIS — C50.919 METASTATIC BREAST CANCER: Primary | ICD-10-CM

## 2020-01-07 PROCEDURE — 96372 THER/PROPH/DIAG INJ SC/IM: CPT | Mod: PN

## 2020-01-07 PROCEDURE — 63600175 PHARM REV CODE 636 W HCPCS: Mod: JG,PN | Performed by: INTERNAL MEDICINE

## 2020-01-07 PROCEDURE — A4216 STERILE WATER/SALINE, 10 ML: HCPCS | Mod: PN | Performed by: INTERNAL MEDICINE

## 2020-01-07 PROCEDURE — 25000003 PHARM REV CODE 250: Mod: PN | Performed by: INTERNAL MEDICINE

## 2020-01-07 PROCEDURE — 96523 IRRIG DRUG DELIVERY DEVICE: CPT | Mod: PN

## 2020-01-07 RX ORDER — HEPARIN 100 UNIT/ML
500 SYRINGE INTRAVENOUS
Status: CANCELLED | OUTPATIENT
Start: 2020-01-27

## 2020-01-07 RX ORDER — SODIUM CHLORIDE 0.9 % (FLUSH) 0.9 %
10 SYRINGE (ML) INJECTION
Status: COMPLETED | OUTPATIENT
Start: 2020-01-07 | End: 2020-01-07

## 2020-01-07 RX ORDER — SODIUM CHLORIDE 0.9 % (FLUSH) 0.9 %
10 SYRINGE (ML) INJECTION
Status: CANCELLED | OUTPATIENT
Start: 2020-01-27

## 2020-01-07 RX ORDER — HEPARIN 100 UNIT/ML
500 SYRINGE INTRAVENOUS
Status: DISCONTINUED | OUTPATIENT
Start: 2020-01-07 | End: 2020-01-07 | Stop reason: HOSPADM

## 2020-01-07 RX ADMIN — DENOSUMAB 120 MG: 120 INJECTION SUBCUTANEOUS at 01:01

## 2020-01-07 RX ADMIN — Medication 10 ML: at 01:01

## 2020-02-04 ENCOUNTER — INFUSION (OUTPATIENT)
Dept: INFUSION THERAPY | Facility: HOSPITAL | Age: 75
End: 2020-02-04
Attending: INTERNAL MEDICINE
Payer: MEDICARE

## 2020-02-04 DIAGNOSIS — C50.919 METASTATIC BREAST CANCER: Primary | ICD-10-CM

## 2020-02-04 DIAGNOSIS — C50.912 MALIGNANT NEOPLASM OF LEFT FEMALE BREAST, UNSPECIFIED ESTROGEN RECEPTOR STATUS, UNSPECIFIED SITE OF BREAST: ICD-10-CM

## 2020-02-04 PROCEDURE — 63600175 PHARM REV CODE 636 W HCPCS: Mod: JG,PN | Performed by: INTERNAL MEDICINE

## 2020-02-04 PROCEDURE — 96372 THER/PROPH/DIAG INJ SC/IM: CPT | Mod: PN

## 2020-02-04 RX ORDER — HEPARIN 100 UNIT/ML
500 SYRINGE INTRAVENOUS
Status: CANCELLED | OUTPATIENT
Start: 2020-02-24

## 2020-02-04 RX ORDER — SODIUM CHLORIDE 0.9 % (FLUSH) 0.9 %
10 SYRINGE (ML) INJECTION
Status: CANCELLED | OUTPATIENT
Start: 2020-02-24

## 2020-02-04 RX ADMIN — DENOSUMAB 120 MG: 120 INJECTION SUBCUTANEOUS at 01:02

## 2020-02-26 ENCOUNTER — DOCUMENTATION ONLY (OUTPATIENT)
Dept: INFUSION THERAPY | Facility: HOSPITAL | Age: 75
End: 2020-02-26

## 2020-02-26 NOTE — PROGRESS NOTES
Per Tay, pt calling to request appt change to 3/6 following MD appt, adjusted 3/3 appt as requested as well as the following appt.

## 2020-03-06 ENCOUNTER — INFUSION (OUTPATIENT)
Dept: INFUSION THERAPY | Facility: HOSPITAL | Age: 75
End: 2020-03-06
Attending: INTERNAL MEDICINE
Payer: MEDICARE

## 2020-03-06 DIAGNOSIS — C50.912 MALIGNANT NEOPLASM OF LEFT FEMALE BREAST, UNSPECIFIED ESTROGEN RECEPTOR STATUS, UNSPECIFIED SITE OF BREAST: ICD-10-CM

## 2020-03-06 DIAGNOSIS — C50.919 METASTATIC BREAST CANCER: Primary | ICD-10-CM

## 2020-03-06 PROCEDURE — 25000003 PHARM REV CODE 250: Mod: PN | Performed by: INTERNAL MEDICINE

## 2020-03-06 PROCEDURE — A4216 STERILE WATER/SALINE, 10 ML: HCPCS | Mod: PN | Performed by: INTERNAL MEDICINE

## 2020-03-06 PROCEDURE — 96372 THER/PROPH/DIAG INJ SC/IM: CPT | Mod: PN

## 2020-03-06 PROCEDURE — 63600175 PHARM REV CODE 636 W HCPCS: Mod: JG,PN | Performed by: INTERNAL MEDICINE

## 2020-03-06 RX ORDER — SODIUM CHLORIDE 0.9 % (FLUSH) 0.9 %
10 SYRINGE (ML) INJECTION
Status: CANCELLED | OUTPATIENT
Start: 2020-03-23

## 2020-03-06 RX ORDER — HEPARIN 100 UNIT/ML
500 SYRINGE INTRAVENOUS
Status: DISCONTINUED | OUTPATIENT
Start: 2020-03-06 | End: 2020-03-06 | Stop reason: HOSPADM

## 2020-03-06 RX ORDER — SODIUM CHLORIDE 0.9 % (FLUSH) 0.9 %
10 SYRINGE (ML) INJECTION
Status: DISCONTINUED | OUTPATIENT
Start: 2020-03-06 | End: 2020-03-06 | Stop reason: HOSPADM

## 2020-03-06 RX ORDER — HEPARIN 100 UNIT/ML
500 SYRINGE INTRAVENOUS
Status: CANCELLED | OUTPATIENT
Start: 2020-03-23

## 2020-03-06 RX ADMIN — DENOSUMAB 120 MG: 120 INJECTION SUBCUTANEOUS at 09:03

## 2021-11-19 NOTE — PLAN OF CARE
Problem: Patient Care Overview  Goal: Discharge Needs Assessment  Outcome: Ongoing (interventions implemented as appropriate)  Pt tolerated Xgeva well   Will return next month foe xgeva -- in 3 months for port flush   Pt aware. Return appointments reviewed and confirmed with pat. Pt discharged ambualtory in stable condition MICK      
Problem: Patient Care Overview  Goal: Plan of Care Review  Outcome: Ongoing (interventions implemented as appropriate)  Pt here for Port flush and monthly xgeva      
Detail Level: Detailed